# Patient Record
Sex: MALE | Race: WHITE | NOT HISPANIC OR LATINO | URBAN - METROPOLITAN AREA
[De-identification: names, ages, dates, MRNs, and addresses within clinical notes are randomized per-mention and may not be internally consistent; named-entity substitution may affect disease eponyms.]

---

## 2022-05-12 ENCOUNTER — APPOINTMENT (RX ONLY)
Dept: URBAN - METROPOLITAN AREA CLINIC 330 | Facility: CLINIC | Age: 82
Setting detail: DERMATOLOGY
End: 2022-05-12

## 2022-05-12 DIAGNOSIS — L90.5 SCAR CONDITIONS AND FIBROSIS OF SKIN: ICD-10-CM

## 2022-05-12 DIAGNOSIS — L81.4 OTHER MELANIN HYPERPIGMENTATION: ICD-10-CM

## 2022-05-12 DIAGNOSIS — Z85.828 PERSONAL HISTORY OF OTHER MALIGNANT NEOPLASM OF SKIN: ICD-10-CM

## 2022-05-12 DIAGNOSIS — L82.1 OTHER SEBORRHEIC KERATOSIS: ICD-10-CM

## 2022-05-12 PROCEDURE — 99213 OFFICE O/P EST LOW 20 MIN: CPT

## 2022-05-12 PROCEDURE — ? COUNSELING

## 2022-05-12 ASSESSMENT — LOCATION DETAILED DESCRIPTION DERM
LOCATION DETAILED: LEFT SUPERIOR UPPER BACK
LOCATION DETAILED: LEFT CLAVICULAR SKIN
LOCATION DETAILED: INFERIOR THORACIC SPINE
LOCATION DETAILED: RIGHT MID-UPPER BACK
LOCATION DETAILED: HAIR

## 2022-05-12 ASSESSMENT — LOCATION SIMPLE DESCRIPTION DERM
LOCATION SIMPLE: LEFT UPPER BACK
LOCATION SIMPLE: RIGHT UPPER BACK
LOCATION SIMPLE: LEFT CLAVICULAR SKIN
LOCATION SIMPLE: HAIR
LOCATION SIMPLE: UPPER BACK

## 2022-05-12 ASSESSMENT — LOCATION ZONE DERM
LOCATION ZONE: SCALP
LOCATION ZONE: TRUNK

## 2022-05-21 ASSESSMENT — LIFESTYLE VARIABLES
HOW OFTEN DO YOU HAVE A DRINK CONTAINING ALCOHOL: NEVER
HOW OFTEN DO YOU HAVE A DRINK CONTAINING ALCOHOL: 1

## 2022-05-21 ASSESSMENT — PATIENT HEALTH QUESTIONNAIRE - PHQ9
1. LITTLE INTEREST OR PLEASURE IN DOING THINGS: 0
SUM OF ALL RESPONSES TO PHQ QUESTIONS 1-9: 0
SUM OF ALL RESPONSES TO PHQ9 QUESTIONS 1 & 2: 0
2. FEELING DOWN, DEPRESSED OR HOPELESS: 0
SUM OF ALL RESPONSES TO PHQ QUESTIONS 1-9: 0

## 2022-05-24 ENCOUNTER — OFFICE VISIT (OUTPATIENT)
Dept: FAMILY MEDICINE CLINIC | Facility: CLINIC | Age: 82
End: 2022-05-24
Payer: MEDICARE

## 2022-05-24 VITALS
BODY MASS INDEX: 28.27 KG/M2 | OXYGEN SATURATION: 99 % | SYSTOLIC BLOOD PRESSURE: 124 MMHG | HEART RATE: 68 BPM | WEIGHT: 197 LBS | DIASTOLIC BLOOD PRESSURE: 80 MMHG

## 2022-05-24 DIAGNOSIS — Z87.442 HISTORY OF NEPHROLITHIASIS: ICD-10-CM

## 2022-05-24 DIAGNOSIS — N18.30 STAGE 3 CHRONIC KIDNEY DISEASE, UNSPECIFIED WHETHER STAGE 3A OR 3B CKD (HCC): ICD-10-CM

## 2022-05-24 DIAGNOSIS — E78.00 HYPERCHOLESTEROLEMIA: ICD-10-CM

## 2022-05-24 DIAGNOSIS — K21.9 GASTROESOPHAGEAL REFLUX DISEASE WITHOUT ESOPHAGITIS: ICD-10-CM

## 2022-05-24 DIAGNOSIS — Z00.00 MEDICARE ANNUAL WELLNESS VISIT, SUBSEQUENT: Primary | ICD-10-CM

## 2022-05-24 DIAGNOSIS — Z23 NEED FOR VACCINATION: ICD-10-CM

## 2022-05-24 DIAGNOSIS — E55.9 VITAMIN D DEFICIENCY: ICD-10-CM

## 2022-05-24 DIAGNOSIS — R73.09 OTHER ABNORMAL GLUCOSE: ICD-10-CM

## 2022-05-24 DIAGNOSIS — Z79.1 LONG TERM (CURRENT) USE OF NON-STEROIDAL ANTI-INFLAMMATORIES (NSAID): ICD-10-CM

## 2022-05-24 DIAGNOSIS — I10 ESSENTIAL HYPERTENSION: ICD-10-CM

## 2022-05-24 PROBLEM — Z97.2 WEARS DENTURES: Status: ACTIVE | Noted: 2019-04-29

## 2022-05-24 PROBLEM — M48.02 SPINAL STENOSIS IN CERVICAL REGION: Status: ACTIVE | Noted: 2019-12-11

## 2022-05-24 PROBLEM — F41.9 ANXIETY: Status: ACTIVE | Noted: 2017-01-29

## 2022-05-24 PROBLEM — I70.1 ATHEROSCLEROSIS OF RENAL ARTERY (HCC): Status: ACTIVE | Noted: 2018-05-18

## 2022-05-24 PROBLEM — I51.89 DIASTOLIC DYSFUNCTION: Status: ACTIVE | Noted: 2019-04-29

## 2022-05-24 PROBLEM — Z97.4 HEARING AID WORN: Status: ACTIVE | Noted: 2017-01-29

## 2022-05-24 PROBLEM — F17.201 TOBACCO DEPENDENCE IN REMISSION: Status: ACTIVE | Noted: 2020-06-15

## 2022-05-24 PROBLEM — I70.1 ATHEROSCLEROSIS OF RENAL ARTERY (HCC): Status: RESOLVED | Noted: 2018-05-18 | Resolved: 2022-05-24

## 2022-05-24 PROBLEM — E66.3 OVERWEIGHT WITH BODY MASS INDEX (BMI) 25.0-29.9: Status: ACTIVE | Noted: 2019-04-29

## 2022-05-24 PROBLEM — H93.13 BILATERAL TINNITUS: Status: ACTIVE | Noted: 2017-01-29

## 2022-05-24 PROCEDURE — G0009 ADMIN PNEUMOCOCCAL VACCINE: HCPCS | Performed by: NURSE PRACTITIONER

## 2022-05-24 PROCEDURE — 1123F ACP DISCUSS/DSCN MKR DOCD: CPT | Performed by: NURSE PRACTITIONER

## 2022-05-24 PROCEDURE — 90732 PPSV23 VACC 2 YRS+ SUBQ/IM: CPT | Performed by: NURSE PRACTITIONER

## 2022-05-24 PROCEDURE — G0439 PPPS, SUBSEQ VISIT: HCPCS | Performed by: NURSE PRACTITIONER

## 2022-05-24 RX ORDER — ASPIRIN 81 MG/1
TABLET ORAL
COMMUNITY

## 2022-05-24 SDOH — ECONOMIC STABILITY: TRANSPORTATION INSECURITY
IN THE PAST 12 MONTHS, HAS THE LACK OF TRANSPORTATION KEPT YOU FROM MEDICAL APPOINTMENTS OR FROM GETTING MEDICATIONS?: NO

## 2022-05-24 SDOH — ECONOMIC STABILITY: TRANSPORTATION INSECURITY
IN THE PAST 12 MONTHS, HAS LACK OF TRANSPORTATION KEPT YOU FROM MEETINGS, WORK, OR FROM GETTING THINGS NEEDED FOR DAILY LIVING?: NO

## 2022-05-24 SDOH — ECONOMIC STABILITY: FOOD INSECURITY: WITHIN THE PAST 12 MONTHS, YOU WORRIED THAT YOUR FOOD WOULD RUN OUT BEFORE YOU GOT MONEY TO BUY MORE.: NEVER TRUE

## 2022-05-24 SDOH — ECONOMIC STABILITY: HOUSING INSECURITY: IN THE LAST 12 MONTHS, HOW MANY PLACES HAVE YOU LIVED?: 1

## 2022-05-24 SDOH — ECONOMIC STABILITY: INCOME INSECURITY: IN THE LAST 12 MONTHS, WAS THERE A TIME WHEN YOU WERE NOT ABLE TO PAY THE MORTGAGE OR RENT ON TIME?: NO

## 2022-05-24 SDOH — HEALTH STABILITY: PHYSICAL HEALTH

## 2022-05-24 SDOH — ECONOMIC STABILITY: HOUSING INSECURITY
IN THE LAST 12 MONTHS, WAS THERE A TIME WHEN YOU DID NOT HAVE A STEADY PLACE TO SLEEP OR SLEPT IN A SHELTER (INCLUDING NOW)?: NO

## 2022-05-24 SDOH — ECONOMIC STABILITY: FOOD INSECURITY: WITHIN THE PAST 12 MONTHS, THE FOOD YOU BOUGHT JUST DIDN'T LAST AND YOU DIDN'T HAVE MONEY TO GET MORE.: NEVER TRUE

## 2022-05-24 ASSESSMENT — ENCOUNTER SYMPTOMS
SHORTNESS OF BREATH: 0
WHEEZING: 0
FACIAL SWELLING: 0
VOICE CHANGE: 0
CHEST TIGHTNESS: 0
STRIDOR: 0
RECTAL PAIN: 0
COUGH: 0
ABDOMINAL DISTENTION: 0
BLOOD IN STOOL: 0
CONSTIPATION: 0
EYE REDNESS: 0
EYES NEGATIVE: 1
EYE PAIN: 0
ALLERGIC/IMMUNOLOGIC NEGATIVE: 1
SINUS PAIN: 0
TROUBLE SWALLOWING: 0
RHINORRHEA: 0
DIARRHEA: 0
APNEA: 0
RESPIRATORY NEGATIVE: 1
VOMITING: 0
COLOR CHANGE: 0
BACK PAIN: 1
PHOTOPHOBIA: 0
NAUSEA: 0
EYE ITCHING: 0
SORE THROAT: 0
ANAL BLEEDING: 0
SINUS PRESSURE: 0
CHOKING: 0
ABDOMINAL PAIN: 0
GASTROINTESTINAL NEGATIVE: 1
EYE DISCHARGE: 0

## 2022-05-24 ASSESSMENT — SOCIAL DETERMINANTS OF HEALTH (SDOH)
HOW HARD IS IT FOR YOU TO PAY FOR THE VERY BASICS LIKE FOOD, HOUSING, MEDICAL CARE, AND HEATING?: NOT HARD AT ALL
WITHIN THE LAST YEAR, HAVE YOU BEEN HUMILIATED OR EMOTIONALLY ABUSED IN OTHER WAYS BY YOUR PARTNER OR EX-PARTNER?: NO
WITHIN THE LAST YEAR, HAVE YOU BEEN AFRAID OF YOUR PARTNER OR EX-PARTNER?: NO
WITHIN THE LAST YEAR, HAVE YOU BEEN KICKED, HIT, SLAPPED, OR OTHERWISE PHYSICALLY HURT BY YOUR PARTNER OR EX-PARTNER?: NO
WITHIN THE LAST YEAR, HAVE TO BEEN RAPED OR FORCED TO HAVE ANY KIND OF SEXUAL ACTIVITY BY YOUR PARTNER OR EX-PARTNER?: NO

## 2022-05-24 NOTE — PROGRESS NOTES
PROGRESS NOTE    Chief Complaint   Patient presents with    Medicare AWV     patient presenting fro AWV. no other concerns today       SUBJECTIVE:     Namita Ochoa is a very pleasant [de-identified] y.o. male with hx of hypertension, hyperlipidemia, anxiety, bladder cancer and skin cancer, seen today in office for follow up for lab results review. He is due for his AWV. He is accompanied by his spouse. Hypertension    The history is provided by the patient. This is a chronic problem. The problem has not changed since onset. Reports compliance with low sodium diet. Pertinent negatives include no chest pain, no orthopnea, no palpitations, no PND, no anxiety, no  confusion, no malaise/fatigue, no blurred vision, no headaches, no tinnitus, no peripheral edema, no dizziness, no shortness of breath, no nausea and no vomiting. Medications have helped lower blood pressure. Risk factors include a sedentary  lifestyle and hypertension. Hyperlipidemia:  The history is provided by the patient. This is a chronic problem. The problem occurs constantly. The problem has not changed since onset. Pertinent negatives  include no chest pain, no abdominal pain, no headaches and no shortness of breath. Nothing aggravates the symptoms. Treatments tried: lipitor. Currently on lipitor. Reports compliance with low fat, low cholesterol diet. The treatment provided moderate relief     Anxiety   The history is provided by the patient. This is a chronic problem. The problem has been gradually improving. Pertinent negatives include no chest pain, no abdominal  pain, no headaches and no shortness of breath. Stressful situations aggravate the symptoms. Calming techniques and medication relieve the symptoms. Treatments tried: cymbalta and ativan. The treatment provided moderate relief. Patient lost his  son about 8 years ago. Patient does use half a tablet of lorazepam nightly for sleep. Chronic back pain:  This is a chronic issue. Patient reports having had multiple low back surgical correction. He was recommended for an SI correction surgery. However, due to risk and complication, patient decided not to proceed with procedure. Patient has neuropathy  to bilateral lower legs and feet. Dose for Cymbalta increased to assist with symptom but no improvement in results. Patient reports good control for many years. He also takes ibuprofen as needed. Pertinent negatives include no chest pain, no shortness of breath,  no palpitation, no unilateral or focal weakness, no hematuria, no incontinence of bladder or bowel function. CKD:  Patient reports having kidney issues for sometime. Pt also reports having kidney stones history. He does admit to not drinking as much water as he should. Pt reports no chest pain,  no SOB, no palpitation, no unilateral or focal weakness, no nausea, no vomiting, no diarrhea, no abdominal pain, no incontinence of bladder or bowel functions. Dysphagia/cough with thin liquids:  Patient reports having history of bone spur to his Cspine which triggered some difficulty with swallowing. Pt also was present for 9/11 and did inhale dust/abestos, etc during 9/11. He reports having had an episode of epiglottis afterwards which required an admission. Patient was seen and evaluated in the past in [place] by an ENT, no changes in treatment as he had negative barium swallowing a couple of years ago. Pt still has symptoms but they are improved with use of pepcid over the counter. However, he has not taken meds on a daily regular basis. Pertinent negatives include no fever or chills, no chest pain, no SOB, no palpitations, no nausea, no vomiting, no GERD, no abdominal pain. Pt was offered in past visits for referral to ENT or GI or barrium swallow for furthe evaluation. Pt respectfully declined at this time but agrees to let me know if symptoms worsened.      Talked about gabapentin    Past Medical History, Past Surgical History, Family history, Social History, and Medications were all reviewed with the patient today and updated as necessary. Current Outpatient Medications   Medication Sig Dispense Refill    atorvastatin (LIPITOR) 40 MG tablet Take 40 mg by mouth daily      DULoxetine (CYMBALTA) 60 MG extended release capsule Take 60 mg by mouth daily      finasteride (PROSCAR) 5 MG tablet Take 5 mg by mouth daily      fluticasone (FLONASE) 50 MCG/ACT nasal spray 2 sprays by Nasal route daily      ibuprofen (ADVIL;MOTRIN) 600 MG tablet Take 600 mg by mouth 3 times daily      LORazepam (ATIVAN) 1 MG tablet Take by mouth.  metoprolol succinate (TOPROL XL) 50 MG extended release tablet Take 50 mg by mouth daily      aspirin 81 MG EC tablet Aspir-81 mg tablet,delayed release   Take 1 tablet every day by oral route.  Multiple Vitamins-Minerals (MULTIVITAMIN ADULTS 50+ PO) Multivitamin 50 Plus   ONCE DAILY      VITAMIN D PO Vitamin D   1 QD      ZINC PO zinc   1 QD       No current facility-administered medications for this visit.      No Known Allergies  Patient Active Problem List   Diagnosis    Coronary atherosclerosis    Wears dentures    Hearing aid worn    History of malignant neoplasm of skin    Long term (current) use of non-steroidal anti-inflammatories (nsaid)    Urolith    Anxiety    Benign neoplasm of colon    Benign prostatic hyperplasia with urinary obstruction    Bilateral tinnitus    Carotid artery stenosis    Diastolic dysfunction    Dyspnea on exertion    Essential hypertension    Gastroesophageal reflux disease    Hearing loss    Hemorrhoids    Herniated lumbar intervertebral disc    Hiatal hernia    Hypercholesterolemia    Low back pain    Neuropathy    Overweight with body mass index (BMI) 25.0-29.9    Raised prostate specific antigen    Spinal stenosis in cervical region    Spinal stenosis of lumbar region    Stage 3 chronic kidney disease (HCC)    Tobacco dependence in remission    [facility] [028181]     History reviewed. No pertinent past medical history. Past Surgical History:   Procedure Laterality Date    BLADDER REPAIR       Family History   Problem Relation Age of Onset    Alzheimer's Disease Paternal Grandmother     Cancer Sister         Pancreatic    Alzheimer's Disease Father     Cancer Mother         bone    Heart Disease Paternal Grandfather      Social History     Tobacco Use    Smoking status: Former Smoker     Quit date: 1965     Years since quittin.4    Smokeless tobacco: Never Used   Substance Use Topics    Alcohol use: Not Currently         REVIEW OF SYSTEM    Review of Systems   Constitutional: Negative. Negative for activity change, appetite change, chills, diaphoresis, fatigue, fever and unexpected weight change. HENT: Negative. Negative for congestion, dental problem, drooling, ear discharge, ear pain, facial swelling, hearing loss, mouth sores, nosebleeds, postnasal drip, rhinorrhea, sinus pressure, sinus pain, sneezing, sore throat, tinnitus, trouble swallowing and voice change. Eyes: Negative. Negative for photophobia, pain, discharge, redness, itching and visual disturbance. Respiratory: Negative. Negative for apnea, cough, choking, chest tightness, shortness of breath, wheezing and stridor. Cardiovascular: Negative. Negative for chest pain, palpitations and leg swelling. Gastrointestinal: Negative. Negative for abdominal distention, abdominal pain, anal bleeding, blood in stool, constipation, diarrhea, nausea, rectal pain and vomiting. Endocrine: Negative. Negative for cold intolerance, heat intolerance, polydipsia, polyphagia and polyuria. Genitourinary: Negative. Negative for decreased urine volume, difficulty urinating, dysuria, enuresis, flank pain, frequency, genital sores, hematuria and urgency. Musculoskeletal: Positive for arthralgias, back pain and myalgias. Negative for gait problem, joint swelling, neck pain and neck stiffness. Skin: Negative. Negative for color change, pallor, rash and wound. Allergic/Immunologic: Negative. Negative for environmental allergies, food allergies and immunocompromised state. Neurological: Negative. Negative for dizziness, tremors, seizures, syncope, facial asymmetry, speech difficulty, weakness, light-headedness, numbness and headaches. Hematological: Negative. Negative for adenopathy. Does not bruise/bleed easily. Psychiatric/Behavioral: Positive for sleep disturbance. Negative for agitation, behavioral problems, confusion, decreased concentration, dysphoric mood, hallucinations, self-injury and suicidal ideas. The patient is nervous/anxious. The patient is not hyperactive. OBJECTIVE:  /80 (Site: Right Upper Arm, Position: Sitting)   Pulse 68   Wt 197 lb (89.4 kg)   SpO2 99%   BMI 28.27 kg/m²      Physical Exam  Constitutional:       General: He is not in acute distress. Appearance: Normal appearance. He is not ill-appearing, toxic-appearing or diaphoretic. HENT:      Head: Normocephalic and atraumatic. Right Ear: Tympanic membrane, ear canal and external ear normal.      Left Ear: Tympanic membrane, ear canal and external ear normal.      Nose: Nose normal.      Mouth/Throat:      Mouth: Mucous membranes are moist.      Pharynx: Oropharynx is clear. Eyes:      Extraocular Movements: Extraocular movements intact. Conjunctiva/sclera: Conjunctivae normal.      Pupils: Pupils are equal, round, and reactive to light. Neck:      Vascular: No carotid bruit. Cardiovascular:      Rate and Rhythm: Normal rate and regular rhythm. Pulses: Normal pulses. Heart sounds: Normal heart sounds. No murmur heard. No friction rub. No gallop. Pulmonary:      Effort: Pulmonary effort is normal. No respiratory distress. Breath sounds: Normal breath sounds. No stridor.  No wheezing, rhonchi or rales. Chest:      Chest wall: No tenderness. Abdominal:      General: Abdomen is flat. Bowel sounds are normal. There is no distension. Palpations: Abdomen is soft. There is no mass. Tenderness: There is no abdominal tenderness. There is no guarding or rebound. Hernia: No hernia is present. Musculoskeletal:         General: Tenderness present. Cervical back: Normal range of motion and neck supple. No rigidity or tenderness. Lymphadenopathy:      Cervical: No cervical adenopathy. Skin:     General: Skin is warm and dry. Capillary Refill: Capillary refill takes less than 2 seconds. Neurological:      General: No focal deficit present. Mental Status: He is alert and oriented to person, place, and time. Psychiatric:         Mood and Affect: Mood normal.         Behavior: Behavior normal.         Thought Content: Thought content normal.         Judgment: Judgment normal.       Medical problems and test results were reviewed with the patient today. Lab Results   Component Value Date     (H) 05/17/2022    K 4.4 05/17/2022     05/17/2022    CO2 23 05/17/2022    BUN 34 (H) 05/17/2022    CREATININE 1.33 (H) 05/17/2022    GLUCOSE 90 05/17/2022    CALCIUM 9.5 05/17/2022    PROT 6.5 05/17/2022    LABALBU 4.1 05/17/2022    BILITOT 0.6 05/17/2022    ALKPHOS 64 05/17/2022    AST 20 05/17/2022    ALT 16 05/17/2022    GFRAA 52 (L) 02/07/2022    AGRATIO 1.7 05/17/2022     Lab Results   Component Value Date    TSH 1.120 05/17/2022     Lab Results   Component Value Date    PSA 2.0 05/17/2022    PSA 1.4 02/07/2022               ASSESSMENT and PLAN    1.  Medicare annual wellness visit, subsequent   Anticipatory guidance for age-seatbelts, avoid cell phone use in car (may use hands free), no texting while driving, avoid social drugs and tobacco, limit alcohol, fall safety, personal safety with appropriate use of helmets and equipment for protection, and appropriate use of sun screen and sun protection to prevent skin cancer. Encourage healthy diet and exercise daily. 2. Essential hypertension  -     CBC with Auto Differential; Future  -     Comprehensive Metabolic Panel; Future  -     TSH 3RD GENERATION; Future    Blood pressure is stable at 124/80. Continue with current therapy    3. Hypercholesterolemia  -     Lipid Panel; Future    Stable. LDL is 54. Continue with current therapy. Repeat fasting labs in 6 months. 4. Other abnormal glucose  -     Hemoglobin A1c with eAG; Future    Glucose is stable at 90. We will check A1c in future labs. 5. Gastroesophageal reflux disease without esophagitis   Patient reports having increased coughing and intermittent dysphagia. He has been taking OTC Tums which provide very minimal relief. He has taken an OTC Pepcid which did provide relief. However, patient is concerned about taking medication on a daily basis. Advised patient that benefit of taking Pepcid on a nightly basis for prevention of silent reflux induced cough/dysphagia outweighs risk of taking medication as he is at increased risk for esophageal ulceration, esophageal varices, anemia due to bleeding, epiglottitis, aspiration pneumonia, stricture, etc. Patient verbalized understanding and agrees to try a once daily at bedtime Pepcid over-the-counter 20 mg. Patient was offered a referral to GI or ENT. However, patient respectfully declined and would like to trial treatment first.  He agrees to let me know if symptoms do not improve, he is to contact me for a referral to GI or ENT for further evaluation. 6. Long term (current) use of non-steroidal anti-inflammatories (nsaid)   Patient with a longstanding history of chronic back pain. He has an appointment with pain management on 6/2. He reports previous history of receiving  an injection to his back that provided moderate relief for a long time.      He has been taking 600 mg ibuprofen once to twice daily for his discomfort. This likely contributes to his slightly elevated creatinine level in addition to not drinking plenty of fluid on a daily basis. Patient was advised to reduce ibuprofen use if possible to trial Tylenol arthritis but to keep maximum dose of Tylenol to less than 4000 mg/day to avoid hepatotoxicity. Patient was offered for another alternative treatment gabapentin or muscle relaxant. We reviewed risk and benefits of medication. Patient would like to wait at this time but agrees to let me know if he would like to trial a medication prior to seeing his pain management specialist.     7. Need for vaccination  -     Pneumococcal polysaccharide vaccine 23-valent greater than or equal to 1yo subcutaneous/IM    Patient is due for pneumonia vaccine. Discussed current CDC recommendation for immunization. Pt is amenable to receiving vaccine today in office. VIS provided. Vaccine administered. 8. Vitamin D deficiency  -     Vitamin D 25 Hydroxy; Future    Vitamin D level normal.  Continue current therapy. 9. Stage 3 chronic kidney disease, unspecified whether stage 3a or 3b CKD (HCC)   Creatinine level improved since last lab draw and is now down to 1.33. Patient has already increase his water intake to about 32 ounces per day. Again patient was advised on importance of daily oral intake of at least 64 ounces per day to ensure hydration for his kidneys. Nephrology referral was offered today but patient respectfully declined and would like to continue to increasing in his fluid intake in addition to reduce his ibuprofen intake. Patient reports he has an appointment with urology soon in June to establish care as he had a history of bladder cancer that he would like to monitor. 10. History of nephrolithiasis  -     Uric Acid; Future    Patient reports a history of kidney stones. We will check uric acid with next lab draw.         Orders Placed This Encounter   Procedures    Pneumococcal polysaccharide vaccine 23-valent greater than or equal to 3yo subcutaneous/IM    CBC with Auto Differential     Standing Status:   Future     Standing Expiration Date:   5/24/2023    Comprehensive Metabolic Panel     Standing Status:   Future     Standing Expiration Date:   5/24/2023    Hemoglobin A1c with eAG     Standing Status:   Future     Standing Expiration Date:   5/24/2023    Lipid Panel     Standing Status:   Future     Standing Expiration Date:   5/24/2023     Order Specific Question:   Is Patient Fasting?/# of Hours     Answer:   8    Vitamin D 25 Hydroxy     Standing Status:   Future     Standing Expiration Date:   5/24/2023    Uric Acid     Standing Status:   Future     Standing Expiration Date:   5/24/2023    TSH 3RD GENERATION     Standing Status:   Future     Standing Expiration Date:   5/24/2023         Elements of this note have been dictated using speech recognition software. As a result, errors of speech recognition may have occurred. On this date 5/24/2022 I have spent 30 minutes reviewing previous notes, test results and face to face with the patient discussing the diagnosis and importance of compliance with the treatment plan as well as documenting on the day of the visit. Greater than 50% of this visit was spent counseling the patient about test results, prognosis, importance of compliance, education about disease process, benefits of medications, instructions for management of acute symptoms, and follow up plans. Return in about 6 months (around 11/24/2022) for Follow up with fasting labs prior.      STEFFANY [place] - CNP     Medicare Annual Wellness Visit    Jenniferela November is here for Medicare AWV (patient presenting fro AWV. no other concerns today)    Assessment & Plan   Medicare annual wellness visit, subsequent        Recommendations for Preventive Services Due: see orders and patient instructions/AVS.  Recommended screening schedule for the next 5-10 years is provided to the patient in written form: see Patient Instructions/AVS.     Return in about 6 months (around 11/24/2022) for Follow up with fasting labs prior. Subjective   The following acute and/or chronic problems were also addressed today:  See above for assessment and plans for chronic problems addressed. Patient's complete Health Risk Assessment and screening values have been reviewed and are found in Flowsheets. The following problems were reviewed today and where indicated follow up appointments were made and/or referrals ordered. Positive Risk Factor Screenings with Interventions:               General Health and ACP:  General  In general, how would you say your health is?: Good  In the past 7 days, have you experienced any of the following: New or Increased Pain, New or Increased Fatigue, Loneliness, Social Isolation, Stress or Anger?: (!) Yes  Select all that apply: (!) New or Increased Pain  Do you get the social and emotional support that you need?: Yes  Do you have a Living Will?: Yes    Advance Directives     Power of  Living Will ACP-Advance Directive ACP-Power of     Not on File Not on File Not on File Not on File      General Health Risk Interventions:  · Pain issues: Pt is scheduled with pain management on 6/2/22. See above for plan   · Anger: Currently taking cymbalta 60mg daily. Trialed additional 20mg without improvement. Takin lorazepam as needed for anxiety/sleep with good results. Objective   Vitals:    05/24/22 1033   BP: 124/80   Site: Right Upper Arm   Position: Sitting   Pulse: 68   SpO2: 99%   Weight: 197 lb (89.4 kg)      Body mass index is 28.27 kg/m². No Known Allergies  Prior to Visit Medications    Medication Sig Taking?  Authorizing Provider   atorvastatin (LIPITOR) 40 MG tablet Take 40 mg by mouth daily Yes Ar Automatic Reconciliation   DULoxetine (CYMBALTA) 60 MG extended release capsule Take 60 mg by mouth daily Yes Ar Automatic Reconciliation   finasteride (PROSCAR) 5 MG tablet Take 5 mg by mouth daily Yes Ar Automatic Reconciliation   fluticasone (FLONASE) 50 MCG/ACT nasal spray 2 sprays by Nasal route daily Yes Ar Automatic Reconciliation   ibuprofen (ADVIL;MOTRIN) 600 MG tablet Take 600 mg by mouth 3 times daily Yes Ar Automatic Reconciliation   LORazepam (ATIVAN) 1 MG tablet Take by mouth. Yes Ar Automatic Reconciliation   metoprolol succinate (TOPROL XL) 50 MG extended release tablet Take 50 mg by mouth daily Yes Ar Automatic Reconciliation   aspirin 81 MG EC tablet Aspir-81 mg tablet,delayed release   Take 1 tablet every day by oral route.   Historical Provider, MD   Multiple Vitamins-Minerals (MULTIVITAMIN ADULTS 50+ PO) Multivitamin 50 Plus   ONCE DAILY  Historical Provider, MD   VITAMIN D PO Vitamin D   1 QD  Historical Provider, MD   ZINC PO zinc   1 QD  Historical Provider, MD [place] (Including outside providers/suppliers regularly involved in providing care):   Patient Care Team:  STEFFANY Carrasco CNP as PCP - General  STEFFANY Carrasco CNP as PCP - [facility] Empaneled Provider     Reviewed and updated this visit:  Tobacco  Allergies  Meds  Problems  Med Hx  Surg Hx  Soc Hx  Fam Hx

## 2022-06-14 ENCOUNTER — TELEMEDICINE (OUTPATIENT)
Dept: FAMILY MEDICINE CLINIC | Facility: CLINIC | Age: 82
End: 2022-06-14
Payer: MEDICARE

## 2022-06-14 DIAGNOSIS — R53.81 MALAISE: ICD-10-CM

## 2022-06-14 DIAGNOSIS — R52 BODY ACHES: ICD-10-CM

## 2022-06-14 DIAGNOSIS — R05.9 COUGH: ICD-10-CM

## 2022-06-14 DIAGNOSIS — R09.81 NASAL CONGESTION: ICD-10-CM

## 2022-06-14 DIAGNOSIS — R50.9 FEVER, UNSPECIFIED FEVER CAUSE: ICD-10-CM

## 2022-06-14 DIAGNOSIS — R09.82 POST-NASAL DRIP: ICD-10-CM

## 2022-06-14 DIAGNOSIS — J06.9 ACUTE URI: ICD-10-CM

## 2022-06-14 DIAGNOSIS — U07.1 COVID: Primary | ICD-10-CM

## 2022-06-14 PROCEDURE — 99442 PR PHYS/QHP TELEPHONE EVALUATION 11-20 MIN: CPT | Performed by: NURSE PRACTITIONER

## 2022-06-14 RX ORDER — FLUTICASONE PROPIONATE 50 MCG
2 SPRAY, SUSPENSION (ML) NASAL DAILY
Qty: 1 EACH | Refills: 0 | Status: SHIPPED | OUTPATIENT
Start: 2022-06-14 | End: 2022-06-28

## 2022-06-14 RX ORDER — DOXYCYCLINE HYCLATE 100 MG/1
100 CAPSULE ORAL 2 TIMES DAILY
Qty: 14 CAPSULE | Refills: 0 | Status: SHIPPED | OUTPATIENT
Start: 2022-06-14 | End: 2022-06-21

## 2022-06-14 RX ORDER — LORATADINE 10 MG/1
10 TABLET ORAL DAILY
Qty: 14 TABLET | Refills: 0 | Status: SHIPPED | OUTPATIENT
Start: 2022-06-14 | End: 2022-06-28

## 2022-06-14 ASSESSMENT — ENCOUNTER SYMPTOMS
EYE DISCHARGE: 0
DIARRHEA: 0
SINUS PAIN: 0
EYE PAIN: 0
SORE THROAT: 1
BACK PAIN: 0
COUGH: 1
BLOOD IN STOOL: 0
ANAL BLEEDING: 0
GASTROINTESTINAL NEGATIVE: 1
SHORTNESS OF BREATH: 0
STRIDOR: 0
ABDOMINAL PAIN: 0
RHINORRHEA: 1
RECTAL PAIN: 0
ABDOMINAL DISTENTION: 0
EYES NEGATIVE: 1
CHEST TIGHTNESS: 0
WHEEZING: 0
ALLERGIC/IMMUNOLOGIC NEGATIVE: 1
CONSTIPATION: 0
NAUSEA: 0
SINUS PRESSURE: 0
TROUBLE SWALLOWING: 0
VOMITING: 0
FACIAL SWELLING: 0
VOICE CHANGE: 0

## 2022-06-14 NOTE — PATIENT INSTRUCTIONS
Patient Education      Patient Education        Upper Respiratory Infection (Cold): Care Instructions  Your Care Instructions     An upper respiratory infection, or URI, is an infection of the nose, sinuses, or throat. URIs are spread by coughs, sneezes, and direct contact. The common cold is the most frequent kind of URI. The flu and sinus infections are otherkinds of URIs. Almost all URIs are caused by viruses. Antibiotics won't cure them. But you can treat most infections with home care. This may include drinking lots of fluids and taking over-the-counter pain medicine. You will probably feel better in 4to 10 days. The doctor has checked you carefully, but problems can develop later. If you notice any problems or new symptoms, get medical treatment right away. Follow-up care is a key part of your treatment and safety. Be sure to make and go to all appointments, and call your doctor if you are having problems. It's also a good idea to know your test results and keep alist of the medicines you take. How can you care for yourself at home?  To prevent dehydration, drink plenty of fluids. Choose water and other clear liquids until you feel better. If you have kidney, heart, or liver disease and have to limit fluids, talk with your doctor before you increase the amount of fluids you drink.  Take an over-the-counter pain medicine, such as acetaminophen (Tylenol), ibuprofen (Advil, Motrin), or naproxen (Aleve). Read and follow all instructions on the label.  Before you use cough and cold medicines, check the label. These medicines may not be safe for young children or for people with certain health problems.  Be careful when taking over-the-counter cold or flu medicines and Tylenol at the same time. Many of these medicines have acetaminophen, which is Tylenol. Read the labels to make sure that you are not taking more than the recommended dose. Too much acetaminophen (Tylenol) can be harmful.    Get plenty of rest.   Do not smoke or allow others to smoke around you. If you need help quitting, talk to your doctor about stop-smoking programs and medicines. These can increase your chances of quitting for good. When should you call for help? Call 911 anytime you think you may need emergency care. For example, call if:     You have severe trouble breathing. Call your doctor now or seek immediate medical care if:     You seem to be getting much sicker.      You have new or worse trouble breathing.      You have a new or higher fever.      You have a new rash. Watch closely for changes in your health, and be sure to contact your doctor if:     You have a new symptom, such as a sore throat, an earache, or sinus pain.      You cough more deeply or more often, especially if you notice more mucus or a change in the color of your mucus.      You do not get better as expected. Where can you learn more? Go to [email or web address] org and sign in to your [employer] account. Enter A717 in the SocialGO box to learn more about \"Upper Respiratory Infection (Cold): Care Instructions. \"     If you do not have an account, please click on the \"Sign Up Now\" link. Current as of: July 6, 2021               Content Version: 13.2  © 2006-2022 Healthwise, Incorporated. Care instructions adapted under license by [facility] [place] ([facility]). If you have questions about a medical condition or this instruction, always ask your healthcare professional. [place] [address] any warranty or liability for your use of this information. 10 Things to Do When You Have COVID-19      Talk to your doctor about treatment. They might have you take medicine to help prevent serious illness. Stay home. Don't go to school, work, or public areas. And don't use public transportation, ride-shares, or taxis unless you have no choice. Leave your home only if you need to get medical care.  But call the doctor's

## 2022-06-14 NOTE — PROGRESS NOTES
[address]  Phone: [phone] Fax [phone]  Sophia Estrada MS, APRN, FNP-C    More Momin is a [de-identified] y.o. male evaluated via audio-only technology on 6/14/2022 for   Chief Complaint   Patient presents with    Nasal Congestion     Pt was unable to figure out how to open link to A/V VV so VV was conducted audio only over the phone. The pt reports starting Sunday PM with nasal congestion with clear rhinorrhea, scratchy sore throat-PND, dry cough secondary to PND, body aches, fever (T-max 101), and malaise. The pt denies any sinus pressure or pain, SOB, wheezing, CP, N/V/D/abd pain associated. Pt reports taking OTC Tylenol, Mucinex DM, Vitamin D, and Zinc. Pt reports being UTD on Covid vax. Pt reports + at home Covid test 6/13/22. .    Assessment & Plan:     1. COVID  The pt reports starting Sunday PM with nasal congestion with clear rhinorrhea, scratchy sore throat-PND, dry cough secondary to PND, body aches, fever (T-max 101), and malaise. The pt denies any sinus pressure or pain, SOB, wheezing, CP, N/V/D/abd pain associated. Pt reports taking OTC Tylenol, Mucinex DM, Vitamin D, and Zinc. Pt reports being UTD on Covid vax. Pt reports + at home Covid test 6/13/22. Discussed with pt. Symptoms relatively mild, but due to age and hx of CKD, will cover with abx for possible secondary acute URI. Checked allergies. Will cover with Doxy 100 mg po bid for 7 days. Will treat other symptoms as below. With symptoms starting Sunday PM,per CDC guidelines below- pt will want to quarantine through at least Friday, 6/17/22 and can clear quarantine after that day as long as his symptoms are improving and he has been fever free for 24 consecutive hours without OTC Tylenol. Pt encouraged to continue to wear a mask that covers his nose and mouth when around others after clearing quarantine until his symptoms fully resolve. Pt can f/u PRN.  Agrees to call in a few days if no better and agrees to call sooner for any new/worsening symptoms or concerns. Pt agrees to go to the ER for severe symptoms as discussed. Per CDC current guidelines for quarantine, patient was instructed to remain in quarantine for at least 5 days and patient can be off of quarantine with strict well fitting mask wearing for the following 5 to 10 days after quarantine provided that symptoms are improved after 5 days of quarantine and patient remains afebrile at least 24 hours without use of antipyretics. Patient was instructed to contact office with any worsening symptoms or go to the ED for any chest pain or shortness of breath. 2. Acute URI  See # 1 above. - doxycycline hyclate (VIBRAMYCIN) 100 MG capsule; Take 1 capsule by mouth 2 times daily for 7 days  Dispense: 14 capsule; Refill: 0    3. Nasal congestion  4. Post-nasal drip  Will have pt continue PRN OTC Mucinex DM as directed. Will also give 14 days of Flonase and Claritin as directed to help with above symptoms. - fluticasone (FLONASE) 50 MCG/ACT nasal spray; 2 sprays by Each Nostril route daily for 14 days  Dispense: 1 each; Refill: 0  - loratadine (CLARITIN) 10 MG tablet; Take 1 tablet by mouth daily for 14 days  Dispense: 14 tablet; Refill: 0    5. Cough  Pt can continue PRN OTC Mucinex DM as directed to help with cough. 6. Body aches  7. Malaise  8. Fever, unspecified fever cause  Pt can continue PRN OTC Tylenol as directed to help with body aches and fever. Pt encouraged to stay well hydrated and get plenty of rest over next few days. Pt can continue OTC Vitamin D and Zinc as before. Recommend pt also take OTC Vitamin C as directed to help boost immune system while sick as well. Return if symptoms worsen or fail to improve, for Call in a few days if no better or sooner for new/worsening symptoms. ER for severe symptoms.     12  Subjective/Objective:     More Momin (: 1940) is a [de-identified] y.o. male, Established patient patient, seen via audio only VV for evaluation of the following chief complaint(s):    Nasal Congestion    Pt was unable to figure out how to open link to A/V VV so VV was conducted audio only over the phone. The pt reports starting Sunday PM with nasal congestion with clear rhinorrhea, scratchy sore throat-PND, dry cough secondary to PND, body aches, fever (T-max 101), and malaise. The pt denies any sinus pressure or pain, SOB, wheezing, CP, N/V/D/abd pain associated. Pt reports taking OTC Tylenol, Mucinex DM, Vitamin D, and Zinc. Pt reports being UTD on Covid vax. Pt reports + at home Covid test 6/13/22. Current Outpatient Medications   Medication Sig Dispense Refill    doxycycline hyclate (VIBRAMYCIN) 100 MG capsule Take 1 capsule by mouth 2 times daily for 7 days 14 capsule 0    fluticasone (FLONASE) 50 MCG/ACT nasal spray 2 sprays by Each Nostril route daily for 14 days 1 each 0    loratadine (CLARITIN) 10 MG tablet Take 1 tablet by mouth daily for 14 days 14 tablet 0    aspirin 81 MG EC tablet Aspir-81 mg tablet,delayed release   Take 1 tablet every day by oral route.  Multiple Vitamins-Minerals (MULTIVITAMIN ADULTS 50+ PO) Multivitamin 50 Plus   ONCE DAILY      VITAMIN D PO Vitamin D   1 QD      ZINC PO zinc   1 QD      atorvastatin (LIPITOR) 40 MG tablet Take 40 mg by mouth daily      DULoxetine (CYMBALTA) 60 MG extended release capsule Take 60 mg by mouth daily      finasteride (PROSCAR) 5 MG tablet Take 5 mg by mouth daily      ibuprofen (ADVIL;MOTRIN) 600 MG tablet Take 600 mg by mouth 3 times daily      LORazepam (ATIVAN) 1 MG tablet Take by mouth.  metoprolol succinate (TOPROL XL) 50 MG extended release tablet Take 50 mg by mouth daily       No current facility-administered medications for this visit. Review of Systems   Constitutional: Positive for fatigue and fever (t-max 101). Negative for appetite change, chills, diaphoresis and unexpected weight change. HENT: Positive for congestion (with clear rhinorrhea), postnasal drip (scratchy sore throat-PND), rhinorrhea (clear) and sore throat (scratchy sore throat-PND). Negative for ear discharge, ear pain, facial swelling, hearing loss, mouth sores, nosebleeds, sinus pressure, sinus pain, sneezing, tinnitus, trouble swallowing and voice change. Eyes: Negative. Negative for pain, discharge and visual disturbance. Respiratory: Positive for cough (dry secondary to PND). Negative for chest tightness, shortness of breath, wheezing and stridor. Cardiovascular: Negative. Negative for chest pain, palpitations and leg swelling. Gastrointestinal: Negative. Negative for abdominal distention, abdominal pain, anal bleeding, blood in stool, constipation, diarrhea, nausea, rectal pain and vomiting. Endocrine: Negative. Genitourinary: Negative. Negative for decreased urine volume, difficulty urinating, dysuria, flank pain, frequency, genital sores, hematuria, penile discharge, penile pain, penile swelling, scrotal swelling, testicular pain and urgency. Musculoskeletal: Positive for myalgias (body aches). Negative for arthralgias, back pain, gait problem, joint swelling, neck pain and neck stiffness. Skin: Negative. Negative for pallor and rash. Allergic/Immunologic: Negative. Negative for environmental allergies. Neurological: Negative. Negative for dizziness, tremors, syncope, weakness, light-headedness, numbness and headaches. Hematological: Negative. Negative for adenopathy. Does not bruise/bleed easily. No flowsheet data found.      No physical exam or vitals performed today as this was an audio only virtual visit    Jackie Jain who was evaluated through a patient-initiated, synchronous (real-time) audio only encounter, and/or he  healthcare decision maker, is aware that it is a billable service, with coverage as determined by he insurance carrier; he  provided verbal consent to proceed: Yes] he   has not had a related appointment within my department in the past 7 days or scheduled within the next 24 hours. Beltran Landa, was evaluated through a synchronous (real-time) audio-video encounter. The patient (or guardian if applicable) is aware that this is a billable service, which includes applicable co-pays. This Virtual Visit was conducted with patient's (and/or legal guardian's) consent. The visit was conducted pursuant to the emergency declaration under the [facility], [facility] authority and the [employer] General Act. Patient identification was verified, and a caregiver was present when appropriate. The patient was located at Home: [address] [facility] [address] [facility]. Provider was located at [facility] ([address] [place] [address]): [address] [place] [address] Dr Rachael Mckay [address] [place] [address]. Total time spent for this encounter: 20 min    --STEFFANY Flanagan NP on 6/14/2022 at 1:22 PM    An electronic signature was used to authenticate this note. On this date 6/14/2022 I have spent 20 minutes reviewing previous notes, test results and face to face (virtual) with the patient discussing the diagnosis and importance of compliance with the treatment plan as well as documenting on the day of the visit. ;    1:00-1:20 PM.     STEFFANY Flanagan NP

## 2022-10-10 RX ORDER — FINASTERIDE 5 MG/1
5 TABLET, FILM COATED ORAL DAILY
Qty: 90 TABLET | Refills: 3 | Status: SHIPPED | OUTPATIENT
Start: 2022-10-10

## 2022-11-07 DIAGNOSIS — Z87.442 HISTORY OF NEPHROLITHIASIS: ICD-10-CM

## 2022-11-07 DIAGNOSIS — R73.09 OTHER ABNORMAL GLUCOSE: ICD-10-CM

## 2022-11-07 DIAGNOSIS — E78.00 HYPERCHOLESTEROLEMIA: ICD-10-CM

## 2022-11-07 DIAGNOSIS — E55.9 VITAMIN D DEFICIENCY: ICD-10-CM

## 2022-11-07 DIAGNOSIS — I10 ESSENTIAL HYPERTENSION: ICD-10-CM

## 2022-11-07 LAB
25(OH)D3 SERPL-MCNC: 73 NG/ML (ref 30–100)
ALBUMIN SERPL-MCNC: 3.6 G/DL (ref 3.2–4.6)
ALBUMIN/GLOB SERPL: 1.2 {RATIO} (ref 0.4–1.6)
ALP SERPL-CCNC: 80 U/L (ref 50–136)
ALT SERPL-CCNC: 26 U/L (ref 12–65)
ANION GAP SERPL CALC-SCNC: 0 MMOL/L (ref 2–11)
AST SERPL-CCNC: 24 U/L (ref 15–37)
BASOPHILS # BLD: 0.1 K/UL (ref 0–0.2)
BASOPHILS NFR BLD: 1 % (ref 0–2)
BILIRUB SERPL-MCNC: 0.7 MG/DL (ref 0.2–1.1)
BUN SERPL-MCNC: 26 MG/DL (ref 8–23)
CALCIUM SERPL-MCNC: 9.5 MG/DL (ref 8.3–10.4)
CHLORIDE SERPL-SCNC: 111 MMOL/L (ref 101–110)
CHOLEST SERPL-MCNC: 118 MG/DL
CO2 SERPL-SCNC: 28 MMOL/L (ref 21–32)
CREAT SERPL-MCNC: 1.3 MG/DL (ref 0.8–1.5)
DIFFERENTIAL METHOD BLD: ABNORMAL
EOSINOPHIL # BLD: 0.3 K/UL (ref 0–0.8)
EOSINOPHIL NFR BLD: 4 % (ref 0.5–7.8)
ERYTHROCYTE [DISTWIDTH] IN BLOOD BY AUTOMATED COUNT: 12.7 % (ref 11.9–14.6)
EST. AVERAGE GLUCOSE BLD GHB EST-MCNC: 108 MG/DL
GLOBULIN SER CALC-MCNC: 2.9 G/DL (ref 2.8–4.5)
GLUCOSE SERPL-MCNC: 97 MG/DL (ref 65–100)
HBA1C MFR BLD: 5.4 % (ref 4.8–5.6)
HCT VFR BLD AUTO: 45.5 % (ref 41.1–50.3)
HDLC SERPL-MCNC: 47 MG/DL (ref 40–60)
HDLC SERPL: 2.5 {RATIO}
HGB BLD-MCNC: 14.8 G/DL (ref 13.6–17.2)
IMM GRANULOCYTES # BLD AUTO: 0.1 K/UL (ref 0–0.5)
IMM GRANULOCYTES NFR BLD AUTO: 1 % (ref 0–5)
LDLC SERPL CALC-MCNC: 51.8 MG/DL
LYMPHOCYTES # BLD: 2.1 K/UL (ref 0.5–4.6)
LYMPHOCYTES NFR BLD: 26 % (ref 13–44)
MCH RBC QN AUTO: 33.2 PG (ref 26.1–32.9)
MCHC RBC AUTO-ENTMCNC: 32.5 G/DL (ref 31.4–35)
MCV RBC AUTO: 102 FL (ref 82–102)
MONOCYTES # BLD: 0.6 K/UL (ref 0.1–1.3)
MONOCYTES NFR BLD: 8 % (ref 4–12)
NEUTS SEG # BLD: 4.9 K/UL (ref 1.7–8.2)
NEUTS SEG NFR BLD: 60 % (ref 43–78)
NRBC # BLD: 0 K/UL (ref 0–0.2)
PLATELET # BLD AUTO: 294 K/UL (ref 150–450)
PMV BLD AUTO: 10 FL (ref 9.4–12.3)
POTASSIUM SERPL-SCNC: 4.4 MMOL/L (ref 3.5–5.1)
PROT SERPL-MCNC: 6.5 G/DL (ref 6.3–8.2)
RBC # BLD AUTO: 4.46 M/UL (ref 4.23–5.6)
SODIUM SERPL-SCNC: 139 MMOL/L (ref 133–143)
TRIGL SERPL-MCNC: 96 MG/DL (ref 35–150)
TSH, 3RD GENERATION: 1.1 UIU/ML (ref 0.36–3.74)
URATE SERPL-MCNC: 6.5 MG/DL (ref 2.6–6)
VLDLC SERPL CALC-MCNC: 19.2 MG/DL (ref 6–23)
WBC # BLD AUTO: 8.1 K/UL (ref 4.3–11.1)

## 2022-11-11 ENCOUNTER — APPOINTMENT (RX ONLY)
Dept: URBAN - METROPOLITAN AREA CLINIC 330 | Facility: CLINIC | Age: 82
Setting detail: DERMATOLOGY
End: 2022-11-11

## 2022-11-11 DIAGNOSIS — Z85.828 PERSONAL HISTORY OF OTHER MALIGNANT NEOPLASM OF SKIN: ICD-10-CM

## 2022-11-11 DIAGNOSIS — L57.8 OTHER SKIN CHANGES DUE TO CHRONIC EXPOSURE TO NONIONIZING RADIATION: ICD-10-CM | Status: INADEQUATELY CONTROLLED

## 2022-11-11 DIAGNOSIS — D69.2 OTHER NONTHROMBOCYTOPENIC PURPURA: ICD-10-CM

## 2022-11-11 DIAGNOSIS — L57.0 ACTINIC KERATOSIS: ICD-10-CM

## 2022-11-11 DIAGNOSIS — D22 MELANOCYTIC NEVI: ICD-10-CM | Status: STABLE

## 2022-11-11 PROBLEM — D48.5 NEOPLASM OF UNCERTAIN BEHAVIOR OF SKIN: Status: ACTIVE | Noted: 2022-11-11

## 2022-11-11 PROBLEM — D22.5 MELANOCYTIC NEVI OF TRUNK: Status: ACTIVE | Noted: 2022-11-11

## 2022-11-11 PROCEDURE — ? MEDICATION COUNSELING

## 2022-11-11 PROCEDURE — ? PRESCRIPTION

## 2022-11-11 PROCEDURE — ? OTHER

## 2022-11-11 PROCEDURE — 17000 DESTRUCT PREMALG LESION: CPT | Mod: 59

## 2022-11-11 PROCEDURE — ? COUNSELING

## 2022-11-11 PROCEDURE — ? BIOPSY BY SHAVE METHOD

## 2022-11-11 PROCEDURE — ? LIQUID NITROGEN

## 2022-11-11 PROCEDURE — ? FULL BODY SKIN EXAM

## 2022-11-11 PROCEDURE — 99214 OFFICE O/P EST MOD 30 MIN: CPT | Mod: 25

## 2022-11-11 PROCEDURE — 17003 DESTRUCT PREMALG LES 2-14: CPT

## 2022-11-11 PROCEDURE — 11102 TANGNTL BX SKIN SINGLE LES: CPT

## 2022-11-11 RX ORDER — FLUOROURACIL 5 MG/G
CREAM TOPICAL QD
Qty: 40 | Refills: 0 | Status: ERX | COMMUNITY
Start: 2022-11-11

## 2022-11-11 RX ADMIN — FLUOROURACIL: 5 CREAM TOPICAL at 00:00

## 2022-11-11 ASSESSMENT — LOCATION ZONE DERM
LOCATION ZONE: NOSE
LOCATION ZONE: ARM
LOCATION ZONE: FACE
LOCATION ZONE: SCALP
LOCATION ZONE: TRUNK

## 2022-11-11 ASSESSMENT — LOCATION SIMPLE DESCRIPTION DERM
LOCATION SIMPLE: SCALP
LOCATION SIMPLE: RIGHT CHEEK
LOCATION SIMPLE: LEFT FOREARM
LOCATION SIMPLE: RIGHT FOREHEAD
LOCATION SIMPLE: RIGHT FOREARM
LOCATION SIMPLE: POSTERIOR SCALP
LOCATION SIMPLE: UPPER BACK
LOCATION SIMPLE: LEFT CLAVICULAR SKIN
LOCATION SIMPLE: LEFT OCCIPITAL SCALP
LOCATION SIMPLE: NOSE
LOCATION SIMPLE: LEFT UPPER ARM
LOCATION SIMPLE: LEFT SCALP

## 2022-11-11 ASSESSMENT — LOCATION DETAILED DESCRIPTION DERM
LOCATION DETAILED: SUPERIOR THORACIC SPINE
LOCATION DETAILED: LEFT DISTAL DORSAL FOREARM
LOCATION DETAILED: LEFT SUPERIOR PARIETAL SCALP
LOCATION DETAILED: RIGHT CENTRAL PARIETAL SCALP
LOCATION DETAILED: LEFT CENTRAL FRONTAL SCALP
LOCATION DETAILED: INFERIOR THORACIC SPINE
LOCATION DETAILED: RIGHT SUPERIOR PREAURICULAR CHEEK
LOCATION DETAILED: RIGHT SUPERIOR FOREHEAD
LOCATION DETAILED: NASAL DORSUM
LOCATION DETAILED: RIGHT VENTRAL LATERAL PROXIMAL FOREARM
LOCATION DETAILED: LEFT DISTAL POSTERIOR UPPER ARM
LOCATION DETAILED: RIGHT PROXIMAL RADIAL DORSAL FOREARM
LOCATION DETAILED: LEFT SUPERIOR OCCIPITAL SCALP
LOCATION DETAILED: POSTERIOR MID-PARIETAL SCALP
LOCATION DETAILED: LEFT CENTRAL PARIETAL SCALP
LOCATION DETAILED: RIGHT VENTRAL DISTAL FOREARM
LOCATION DETAILED: LEFT CLAVICULAR SKIN
LOCATION DETAILED: RIGHT SUPERIOR PARIETAL SCALP

## 2022-11-11 ASSESSMENT — PAIN INTENSITY VAS: HOW INTENSE IS YOUR PAIN 0 BEING NO PAIN, 10 BEING THE MOST SEVERE PAIN POSSIBLE?: 1/10 PAIN

## 2022-11-11 NOTE — PROCEDURE: MEDICATION COUNSELING
Assessment/Plan:    No problem-specific Assessment & Plan notes found for this encounter  Diagnoses and all orders for this visit:    Annual physical exam    Screening for diabetic retinopathy  Comments:  follows with Lauro Stern - due for exam Dec 2023  Encounter for screening mammogram for malignant neoplasm of breast  -     Mammo screening bilateral w 3d & cad; Future    Screening for osteoporosis  -     DXA bone density spine hip and pelvis; Future    Diabetes mellitus without complication ([facility] [address] )  -     Lipid panel; Future    Anxiety disorder due to general medical condition with panic attack  -     busPIRone (BUSPAR) 10 mg tablet; Take 2 tablets (20 mg total) by mouth 3 (three) times a day as needed (anxiety)    PTSD (post-traumatic stress disorder)  -     busPIRone (BUSPAR) 10 mg tablet; Take 2 tablets (20 mg total) by mouth 3 (three) times a day as needed (anxiety)    Chronic hip pain, bilateral  -     XR hips bilateral 3-4 vw w pelvis if performed; Future    Other fatigue  -     TSH, 3rd generation with Free T4 reflex; Future  -     Comprehensive metabolic panel; Future    Other orders  -     NovoLIN 70/30 FlexPen (70-30) 100 UNIT/ML injection pen  -     ferrous sulfate 325 (65 Fe) mg tablet; Take 325 mg by mouth          -increases buspar to 20 mg TID  Continues to follow with LCSW  -follow up with multiple specialists as routinely done  -labs as ordered  -RTC 3 months     Subjective:      Patient ID: Donn Soriano is a 48 y o  female PMH GERD, DM, HTN, HLD, s/p spinal fusion 5/11 for C2 vertebral fracture presents for annual PE  Follows with Maryam Parrish for DM care, has appointment today  Follows with Dr Perry Clemons podiatry for routine foot exams and [facility] for eye exams     BP largly at goal  Follows with [facility] cardiology  Denies CP, SOB  Reports bilateral hip pain on going for several months   Notes it has been exacerbated and she attributes it to a fall she sustained in Jan 2022 which caused a vertebral fracture  Worse with movements  Concern for OA on 2021 CT  Requests refill on her buspar  She notes it is not helping as much as it once did  Currently taking 20mg BID  Follows with LCSW  HPI    The following portions of the patient's history were reviewed and updated as appropriate: allergies, current medications, past family history, past medical history, past social history, past surgical history and problem list     Review of Systems   Constitutional: Positive for activity change and fatigue  HENT: Negative  Respiratory: Negative for cough, shortness of breath and wheezing  Cardiovascular: Negative for chest pain, palpitations and leg swelling  Gastrointestinal: Negative  Musculoskeletal: Positive for arthralgias  Objective:      /76   Pulse 88   Resp 18   Ht 5' 5" (1 651 m)   Wt 98 kg (216 lb)   LMP  (LMP Unknown)   SpO2 97%   BMI 35 94 kg/m²          Physical Exam  Constitutional:       General: She is not in acute distress  Appearance: Normal appearance  She is well-developed  HENT:      Head: Normocephalic and atraumatic  Eyes:      Pupils: Pupils are equal, round, and reactive to light  Cardiovascular:      Rate and Rhythm: Normal rate and regular rhythm  Heart sounds: Normal heart sounds  No murmur heard  Pulmonary:      Effort: Pulmonary effort is normal  No respiratory distress  Breath sounds: Normal breath sounds  No wheezing  Abdominal:      General: Bowel sounds are normal  There is no distension  Palpations: Abdomen is soft  Tenderness: There is no abdominal tenderness  Musculoskeletal:      Right lower leg: No edema  Left lower leg: No edema  Skin:     General: Skin is warm and dry  Capillary Refill: Capillary refill takes less than 2 seconds  Neurological:      Mental Status: She is alert and oriented to person, place, and time     Psychiatric:         Mood and Affect: Mood normal          Behavior: Behavior normal          Thought Content:  Thought content normal          Judgment: Judgment normal  Dutasteride Pregnancy And Lactation Text: This medication is absolutely contraindicated in women, especially during pregnancy and breast feeding. Feminization of male fetuses is possible if taking while pregnant.

## 2022-11-11 NOTE — PROCEDURE: BIOPSY BY SHAVE METHOD
Hemostasis: Aluminum Chloride
Anesthesia Volume In Cc (Will Not Render If 0): 0.5
Cryotherapy Text: The wound bed was treated with cryotherapy after the biopsy was performed.
Lab: 6
Hide Anticipated Plan (Based On Presumed Biopsy Results)?: No
Dressing: bandage
Post-Care Instructions: I reviewed with the patient in detail post-care instructions. Patient is to keep the biopsy site dry overnight, and then apply bacitracin twice daily until healed. Patient may apply hydrogen peroxide soaks to remove any crusting.
Biopsy Method: Dermablade
Curettage Text: The wound bed was treated with curettage after the biopsy was performed.
Accession #: Skin path
Depth Of Biopsy: dermis
Anesthesia Type: 1% lidocaine with 1:100,000 epinephrine and a 1:10 solution of 8.4% sodium bicarbonate
Silver Nitrate Text: The wound bed was treated with silver nitrate after the biopsy was performed.
Wound Care: Petrolatum
Notification Instructions: Patient will be notified of biopsy results. However, patient instructed to call the office if not contacted within 2 weeks.
Biopsy Type: H and E
X Size Of Lesion In Cm: 0
Type Of Destruction Used: Curettage
Detail Level: Detailed
Electrodesiccation And Curettage Text: The wound bed was treated with electrodesiccation and curettage after the biopsy was performed.
Consent: Written consent was obtained and risks were reviewed including but not limited to scarring, infection, bleeding, scabbing, incomplete removal, nerve damage and allergy to anesthesia.
Billing Type: Third-Party Bill
Electrodesiccation Text: The wound bed was treated with electrodesiccation after the biopsy was performed.
Information: Selecting Yes will display possible errors in your note based on the variables you have selected. This validation is only offered as a suggestion for you. PLEASE NOTE THAT THE VALIDATION TEXT WILL BE REMOVED WHEN YOU FINALIZE YOUR NOTE. IF YOU WANT TO FAX A PRELIMINARY NOTE YOU WILL NEED TO TOGGLE THIS TO 'NO' IF YOU DO NOT WANT IT IN YOUR FAXED NOTE.
Was A Bandage Applied: Yes

## 2022-11-11 NOTE — PROCEDURE: LIQUID NITROGEN
Number Of Freeze-Thaw Cycles: 3 freeze-thaw cycles
Post-Care Instructions: I reviewed with the patient in detail post-care instructions. Patient is to wear sunprotection, and avoid picking at any of the treated lesions. Pt may apply Vaseline to crusted or scabbing areas.
Duration Of Freeze Thaw-Cycle (Seconds): 2
Render Note In Bullet Format When Appropriate: No
Detail Level: Detailed
Show Aperture Variable?: Yes
Consent: The patient's consent was obtained including but not limited to risks of crusting, scabbing, blistering, scarring, darker or lighter pigmentary change, recurrence, incomplete removal and infection.

## 2022-11-11 NOTE — PROCEDURE: MEDICATION COUNSELING
Xelannz Pregnancy And Lactation Text: This medication is Pregnancy Category D and is not considered safe during pregnancy.  The risk during breast feeding is also uncertain.

## 2022-11-11 NOTE — PROCEDURE: OTHER
Detail Level: Simple
Render Risk Assessment In Note?: yes
Note Text (......Xxx Chief Complaint.): This diagnosis correlates with the

## 2022-11-16 ENCOUNTER — OFFICE VISIT (OUTPATIENT)
Dept: FAMILY MEDICINE CLINIC | Facility: CLINIC | Age: 82
End: 2022-11-16
Payer: MEDICARE

## 2022-11-16 VITALS
OXYGEN SATURATION: 99 % | HEART RATE: 81 BPM | DIASTOLIC BLOOD PRESSURE: 80 MMHG | SYSTOLIC BLOOD PRESSURE: 116 MMHG | WEIGHT: 197 LBS | BODY MASS INDEX: 28.27 KG/M2

## 2022-11-16 DIAGNOSIS — F41.1 GENERALIZED ANXIETY DISORDER: ICD-10-CM

## 2022-11-16 DIAGNOSIS — R13.10 DYSPHAGIA, UNSPECIFIED TYPE: Primary | ICD-10-CM

## 2022-11-16 DIAGNOSIS — J30.89 OTHER ALLERGIC RHINITIS: ICD-10-CM

## 2022-11-16 DIAGNOSIS — R25.1 TREMOR OF LEFT HAND: ICD-10-CM

## 2022-11-16 DIAGNOSIS — I10 ESSENTIAL HYPERTENSION: ICD-10-CM

## 2022-11-16 DIAGNOSIS — R09.82 POST-NASAL DRIP: ICD-10-CM

## 2022-11-16 DIAGNOSIS — U09.9 COVID-19 LONG HAULER MANIFESTING CHRONIC FATIGUE: ICD-10-CM

## 2022-11-16 DIAGNOSIS — Z87.09 HISTORY OF EPIGLOTTITIS: ICD-10-CM

## 2022-11-16 DIAGNOSIS — Z98.890 HISTORY OF VOCAL CORD POLYPECTOMY: ICD-10-CM

## 2022-11-16 DIAGNOSIS — G93.32 COVID-19 LONG HAULER MANIFESTING CHRONIC FATIGUE: ICD-10-CM

## 2022-11-16 DIAGNOSIS — R49.0 HOARSENESS: ICD-10-CM

## 2022-11-16 DIAGNOSIS — R09.81 NASAL CONGESTION: ICD-10-CM

## 2022-11-16 PROCEDURE — G8484 FLU IMMUNIZE NO ADMIN: HCPCS | Performed by: NURSE PRACTITIONER

## 2022-11-16 PROCEDURE — G8417 CALC BMI ABV UP PARAM F/U: HCPCS | Performed by: NURSE PRACTITIONER

## 2022-11-16 PROCEDURE — G8427 DOCREV CUR MEDS BY ELIG CLIN: HCPCS | Performed by: NURSE PRACTITIONER

## 2022-11-16 PROCEDURE — 1123F ACP DISCUSS/DSCN MKR DOCD: CPT | Performed by: NURSE PRACTITIONER

## 2022-11-16 PROCEDURE — 1036F TOBACCO NON-USER: CPT | Performed by: NURSE PRACTITIONER

## 2022-11-16 PROCEDURE — 3078F DIAST BP <80 MM HG: CPT | Performed by: NURSE PRACTITIONER

## 2022-11-16 PROCEDURE — 99214 OFFICE O/P EST MOD 30 MIN: CPT | Performed by: NURSE PRACTITIONER

## 2022-11-16 PROCEDURE — 3074F SYST BP LT 130 MM HG: CPT | Performed by: NURSE PRACTITIONER

## 2022-11-16 RX ORDER — DULOXETIN HYDROCHLORIDE 60 MG/1
60 CAPSULE, DELAYED RELEASE ORAL DAILY
Qty: 90 CAPSULE | Refills: 3 | Status: SHIPPED | OUTPATIENT
Start: 2022-11-16

## 2022-11-16 RX ORDER — GABAPENTIN 100 MG/1
100 CAPSULE ORAL 2 TIMES DAILY
Qty: 180 CAPSULE | Refills: 1 | Status: SHIPPED | OUTPATIENT
Start: 2022-11-16 | End: 2023-05-15

## 2022-11-16 RX ORDER — DOXYCYCLINE 100 MG/1
100 TABLET ORAL 2 TIMES DAILY
Qty: 14 TABLET | Refills: 0 | Status: SHIPPED | OUTPATIENT
Start: 2022-11-16 | End: 2022-11-23

## 2022-11-16 RX ORDER — FLUTICASONE PROPIONATE 50 MCG
2 SPRAY, SUSPENSION (ML) NASAL DAILY
Qty: 3 EACH | Refills: 3 | Status: SHIPPED | OUTPATIENT
Start: 2022-11-16

## 2022-11-16 RX ORDER — AZELASTINE 1 MG/ML
1 SPRAY, METERED NASAL 2 TIMES DAILY
Qty: 60 ML | Refills: 11 | Status: SHIPPED | OUTPATIENT
Start: 2022-11-16

## 2022-11-16 RX ORDER — METOPROLOL SUCCINATE 25 MG/1
25 TABLET, EXTENDED RELEASE ORAL DAILY
Qty: 90 TABLET | Refills: 3 | Status: SHIPPED | OUTPATIENT
Start: 2022-11-16

## 2022-11-16 RX ORDER — LORAZEPAM 1 MG/1
TABLET ORAL
Qty: 30 TABLET | Refills: 5 | Status: SHIPPED | OUTPATIENT
Start: 2022-11-16 | End: 2023-05-15

## 2022-11-16 ASSESSMENT — PATIENT HEALTH QUESTIONNAIRE - PHQ9
1. LITTLE INTEREST OR PLEASURE IN DOING THINGS: 0
SUM OF ALL RESPONSES TO PHQ QUESTIONS 1-9: 0
SUM OF ALL RESPONSES TO PHQ9 QUESTIONS 1 & 2: 0
SUM OF ALL RESPONSES TO PHQ QUESTIONS 1-9: 0
2. FEELING DOWN, DEPRESSED OR HOPELESS: 0

## 2022-11-16 NOTE — PROGRESS NOTES
PROGRESS NOTE    Chief Complaint   Patient presents with    6 Month Follow-Up     Pt had covid in June and reports he is still SOB at times with intense coughing. Pt also reports some shakiness in his hands. Pt's wife reports he is also having some concerns with his BP and pulse being high, pt has 4 days of pulse listed. PMH of polyps on his vocal cords and the coughing seems to have made the symptoms return. SUBJECTIVE:     Oren Martínez is a very pleasant [de-identified] y.o. male with hx of hypertension, hyperlipidemia, anxiety, bladder cancer and skin cancer, seen today in office for follow up for lab results review. He is accompanied by his spouse. Hypertension    This is a chronic problem. The problem has not changed since onset. Reports compliance with low sodium diet. Pertinent negatives include no chest pain, no orthopnea, no palpitations, no PND, no anxiety, no  confusion, no malaise/fatigue, no blurred vision, no headaches, no tinnitus, no peripheral edema, no dizziness, no shortness of breath, no nausea and no vomiting. Medications have helped lower blood pressure. Patient report 1 episode of hypotension and having increased fatigue. Risk factors include a sedentary  lifestyle and hypertension. Hyperlipidemia: This is a chronic problem. The problem occurs constantly. The problem has not changed since onset. Pertinent negatives  include no chest pain, no abdominal pain, no headaches and no shortness of breath. Nothing aggravates the symptoms. Treatments tried: lipitor. Currently on lipitor. Reports compliance with low fat, low cholesterol diet. The treatment provided moderate relief     Anxiety   This is a chronic problem. The problem has been gradually improving. Pertinent negatives include no chest pain, no abdominal  pain, no headaches and no shortness of breath. Stressful situations aggravate the symptoms. Calming techniques and medication relieve the symptoms.  Treatments tried: cymbalta and ativan. The treatment provided moderate relief. Patient lost his  son about 8 years ago. Patient does use half a tablet of lorazepam nightly for sleep. Chronic back pain:  This is a chronic issue. Patient reports having had multiple low back surgical correction. He was recommended for an SI correction surgery. However, due to risk and complication, patient decided not to proceed with procedure. Patient has neuropathy  to bilateral lower legs and feet. Dose for Cymbalta increased to assist with symptom but no improvement in results. Patient reports good control for many years. He also takes ibuprofen as needed. Pertinent negatives include no chest pain, no shortness of breath,  no palpitation, no unilateral or focal weakness, no hematuria, no incontinence of bladder or bowel function. CKD:  Patient reports having kidney issues for sometime. Pt also reports having kidney stones history. He does admit to not drinking as much water as he should. Pt reports no chest pain,  no SOB, no palpitation, no unilateral or focal weakness, no nausea, no vomiting, no diarrhea, no abdominal pain, no incontinence of bladder or bowel functions. Dysphagia/hoarseness:  Patient reports having history of bone spur to his Cspine which triggered some difficulty with swallowing. Patient also reports a history of vocal polyps previously had surgical removal in early 2000. Pt also was present for 9/11 and did inhale dust/abestos, etc during 9/11. He reports having had an episode of epiglottis afterwards which required an admission. Patient was seen and evaluated in the past in [place] by an ENT, no changes in treatment as he had negative barium swallowing a couple of years ago. Pt still has symptoms but they are slightly improved with use of pepcid over the counter. Patient has been offered to be referred to GI and ENT in the past but respectfully declined at that time.   He is interested in seeing specialist groups at this time. Pertinent negatives include no fever or chills, no chest pain, no SOB, no palpitations, no nausea, no vomiting, no GERD, no abdominal pain. Past Medical History, Past Surgical History, Family history, Social History, and Medications were all reviewed with the patient today and updated as necessary. Current Outpatient Medications   Medication Sig Dispense Refill    gabapentin (NEURONTIN) 100 MG capsule Take 1 capsule by mouth 2 times daily for 180 days. Intended supply: 90 days 180 capsule 1    metoprolol succinate (TOPROL XL) 25 MG extended release tablet Take 1 tablet by mouth daily For heart rate 90 tablet 3    azelastine (ASTELIN) 0.1 % nasal spray 1 spray by Nasal route 2 times daily 60 mL 11    doxycycline monohydrate (ADOXA) 100 MG tablet Take 1 tablet by mouth 2 times daily for 7 days (Antibiotic) 14 tablet 0    LORazepam (ATIVAN) 1 MG tablet Take 1/2 to 1 tablet orally up to 2 times daily as needed for anxiety 30 tablet 5    DULoxetine (CYMBALTA) 60 MG extended release capsule Take 1 capsule by mouth daily 90 capsule 3    fluticasone (FLONASE) 50 MCG/ACT nasal spray 2 sprays by Each Nostril route daily 3 each 3    finasteride (PROSCAR) 5 MG tablet Take 1 tablet by mouth daily 90 tablet 3    aspirin 81 MG EC tablet Aspir-81 mg tablet,delayed release   Take 1 tablet every day by oral route. Multiple Vitamins-Minerals (MULTIVITAMIN ADULTS 50+ PO) Multivitamin 50 Plus   ONCE DAILY      VITAMIN D PO Vitamin D   1 QD      ZINC PO zinc   1 QD      atorvastatin (LIPITOR) 40 MG tablet Take 40 mg by mouth daily      ibuprofen (ADVIL;MOTRIN) 600 MG tablet Take 600 mg by mouth 3 times daily       No current facility-administered medications for this visit.      No Known Allergies  Patient Active Problem List   Diagnosis    Coronary atherosclerosis    Wears dentures    Hearing aid worn    History of malignant neoplasm of skin    Long term (current) use of non-steroidal anti-inflammatories (nsaid)    Urolith    Anxiety    Benign neoplasm of colon    Benign prostatic hyperplasia with urinary obstruction    Bilateral tinnitus    Carotid artery stenosis    Diastolic dysfunction    Dyspnea on exertion    Essential hypertension    Gastroesophageal reflux disease    Hearing loss    Hemorrhoids    Herniated lumbar intervertebral disc    Hiatal hernia    Hypercholesterolemia    Low back pain    Neuropathy    Overweight with body mass index (BMI) 25.0-29.9    Raised prostate specific antigen    Spinal stenosis in cervical region    Spinal stenosis of lumbar region    Stage 3 chronic kidney disease ([facility] [address]    Tobacco dependence in remission    Chronic renal disease, stage III ([facility] [address] [187016]     History reviewed. No pertinent past medical history. Past Surgical History:   Procedure Laterality Date    BLADDER REPAIR       Family History   Problem Relation Age of Onset    Alzheimer's Disease Paternal Grandmother     Cancer Sister         Pancreatic    Alzheimer's Disease Father     Cancer Mother         bone    Heart Disease Paternal Grandfather      Social History     Tobacco Use    Smoking status: Former     Types: Cigarettes     Quit date: 1965     Years since quittin.9    Smokeless tobacco: Never   Substance Use Topics    Alcohol use: Not Currently         REVIEW OF SYSTEM    Review of Systems   Constitutional: Negative. Negative for activity change, appetite change, chills, diaphoresis, fatigue, fever and unexpected weight change. HENT:  Positive for congestion, sore throat and voice change. Negative for dental problem, drooling, ear discharge, ear pain, facial swelling, hearing loss, mouth sores, nosebleeds, postnasal drip, rhinorrhea, sinus pressure, sinus pain, sneezing, tinnitus and trouble swallowing. Eyes: Negative. Negative for photophobia, pain, discharge, redness, itching and visual disturbance. Respiratory:  Positive for cough.  Negative for apnea, choking, chest tightness, shortness of breath, wheezing and stridor. Cardiovascular: Negative. Negative for chest pain, palpitations and leg swelling. Gastrointestinal: Negative. Negative for abdominal distention, abdominal pain, anal bleeding, blood in stool, constipation, diarrhea, nausea, rectal pain and vomiting. Endocrine: Negative. Negative for cold intolerance, heat intolerance, polydipsia, polyphagia and polyuria. Genitourinary: Negative. Negative for decreased urine volume, difficulty urinating, dysuria, enuresis, flank pain, frequency, genital sores, hematuria and urgency. Musculoskeletal:  Positive for arthralgias, back pain and myalgias. Negative for gait problem, joint swelling, neck pain and neck stiffness. Skin: Negative. Negative for color change, pallor, rash and wound. Allergic/Immunologic: Negative. Negative for environmental allergies, food allergies and immunocompromised state. Neurological: Negative. Negative for dizziness, tremors, seizures, syncope, facial asymmetry, speech difficulty, weakness, light-headedness, numbness and headaches. Hematological: Negative. Negative for adenopathy. Does not bruise/bleed easily. Psychiatric/Behavioral:  Positive for sleep disturbance. Negative for agitation, behavioral problems, confusion, decreased concentration, dysphoric mood, hallucinations, self-injury and suicidal ideas. The patient is nervous/anxious. The patient is not hyperactive. OBJECTIVE:  /80 (Site: Left Upper Arm, Position: Sitting)   Pulse 81   Wt 197 lb (89.4 kg)   SpO2 99%   BMI 28.27 kg/m²      Physical Exam  Constitutional:       General: He is not in acute distress. Appearance: Normal appearance. He is not ill-appearing, toxic-appearing or diaphoretic. HENT:      Head: Normocephalic and atraumatic.       Right Ear: Tympanic membrane, ear canal and external ear normal.      Left Ear: Tympanic membrane, ear canal and external ear normal.      Nose: Nose normal.      Mouth/Throat:      Mouth: Mucous membranes are moist.      Pharynx: Oropharynx is clear. Eyes:      Extraocular Movements: Extraocular movements intact. Conjunctiva/sclera: Conjunctivae normal.      Pupils: Pupils are equal, round, and reactive to light. Neck:      Vascular: No carotid bruit. Cardiovascular:      Rate and Rhythm: Normal rate and regular rhythm. Pulses: Normal pulses. Heart sounds: Normal heart sounds. No murmur heard. No friction rub. No gallop. Pulmonary:      Effort: Pulmonary effort is normal. No respiratory distress. Breath sounds: Normal breath sounds. No stridor. No wheezing, rhonchi or rales. Chest:      Chest wall: No tenderness. Abdominal:      General: Abdomen is flat. Bowel sounds are normal. There is no distension. Palpations: Abdomen is soft. There is no mass. Tenderness: There is no abdominal tenderness. There is no guarding or rebound. Hernia: No hernia is present. Musculoskeletal:      Cervical back: Normal range of motion and neck supple. No rigidity or tenderness. Lymphadenopathy:      Cervical: No cervical adenopathy. Skin:     General: Skin is warm and dry. Capillary Refill: Capillary refill takes less than 2 seconds. Neurological:      General: No focal deficit present. Mental Status: He is alert and oriented to person, place, and time. Psychiatric:         Mood and Affect: Mood normal.         Behavior: Behavior normal.         Thought Content: Thought content normal.         Judgment: Judgment normal.         Medical problems and test results were reviewed with the patient today.    Lab Results   Component Value Date     11/07/2022    K 4.4 11/07/2022     (H) 11/07/2022    CO2 28 11/07/2022    BUN 26 (H) 11/07/2022    CREATININE 1.30 11/07/2022    GLUCOSE 97 11/07/2022    CALCIUM 9.5 11/07/2022    PROT 6.5 11/07/2022    LABALBU 3.6 11/07/2022    BILITOT 0.7 11/07/2022    ALKPHOS 80 11/07/2022    AST 24 11/07/2022    ALT 26 11/07/2022    LABGLOM 56 (L) 11/07/2022    GFRAA 52 (L) 02/07/2022    AGRATIO 1.7 05/17/2022    GLOB 2.9 11/07/2022       Lab Results   Component Value Date    WBC 8.1 11/07/2022    HGB 14.8 11/07/2022    HCT 45.5 11/07/2022    .0 11/07/2022     11/07/2022     Lab Results   Component Value Date    TSH 1.120 05/17/2022    CUQ0TQF 1.100 11/07/2022     Lab Results   Component Value Date    CHOL 118 11/07/2022    CHOL 110 05/17/2022    CHOL 112 02/07/2022     Lab Results   Component Value Date    TRIG 96 11/07/2022    TRIG 95 05/17/2022    TRIG 77 02/07/2022     Lab Results   Component Value Date    HDL 47 11/07/2022    HDL 38 (L) 05/17/2022    HDL 40 02/07/2022     Lab Results   Component Value Date    LDLCALC 51.8 11/07/2022    LDLCALC 54 05/17/2022    LDLCALC 56 02/07/2022     Lab Results   Component Value Date    LABVLDL 19.2 11/07/2022    VLDL 18 05/17/2022    VLDL 16 02/07/2022     Lab Results   Component Value Date    CHOLHDLRATIO 2.5 11/07/2022     Hemoglobin A1C   Date Value Ref Range Status   11/07/2022 5.4 4.8 - 5.6 % Final            ASSESSMENT and PLAN    1. Dysphagia, unspecified type  -     [facility]    Patient with a longstanding history of dysphagia. History of epiglottitis and vocal cord polypectomy in early 2000. He continues to endorse hoarseness, congestion. He continues to use fluticasone nasal spray but feels that his hoarseness and congestion appears to be worsened when he is working outside. He also continues to have coughing episodes with thin liquids but they are not significant with solid food. He has had barium swallow about 2 years ago for evaluation that showed no abnormalities. Patient has been offered for referral to GI/ENT for evaluation in the past but respectfully declined at that time. He has noticed improvement with use of Pepcid but now not significant.   We have also discussed a repeat in barium swallow for evaluation at this time-patient respectfully declined. Offer for ENT referral-patient agreeable. Referral placed for consideration. We have also discussed GI referral but patient would like to wait at this time due to his history of epiglottitis/vocal cord polyps and would like to see ENT first.    2. History of epiglottitis  -     [facility]    As above. 3. History of vocal cord polypectomy  -     [facility]    As above. 4. Hoarseness  -     [facility]    As above. Etiology may include recurrence of epiglottitis, postnasal drip/congestion, vocal Polyps, GERD, etc.  Patient is using fluticasone nasal spray daily. We will add Astelin nasal spray to assist with postnasal drip treatment. Patient also had COVID about 2 months ago and since then has noted more congestion and worsening in symptom. He also reports increasing in fatigue since having COVID. He has noted increasing congestion if he is working outside more in comparison to being indoors. We will start doxycycline twice daily for 1 week for bacterial treatment. Reviewed risk and side effects of medication including GI upset and increased risk for opportunistic infection such as yeast and C. difficile. Patient was advised to take medication with food and to increase probiotic supplementation (via yogurt, cottage cheese, cultured foods/drinks or OTC probiotic supplements) to avoid any opportunistic infection. We will follow-up in 3 weeks. 5. Nasal congestion  -     azelastine (ASTELIN) 0.1 % nasal spray; 1 spray by Nasal route 2 times daily, Disp-60 mL, R-11Normal  -     doxycycline monohydrate (ADOXA) 100 MG tablet; Take 1 tablet by mouth 2 times daily for 7 days (Antibiotic), Disp-14 tablet, R-0Normal  -     fluticasone (FLONASE) 50 MCG/ACT nasal spray; 2 sprays by Each Nostril route daily, Disp-3 each, R-3Normal    As above.     6. Post-nasal drip  -     azelastine (ASTELIN) 0.1 % nasal spray; 1 spray by Nasal route 2 times daily, Disp-60 mL, R-11Normal  -     fluticasone (FLONASE) 50 MCG/ACT nasal spray; 2 sprays by Each Nostril route daily, Disp-3 each, R-3Normal    As above. 7. Other allergic rhinitis  -     azelastine (ASTELIN) 0.1 % nasal spray; 1 spray by Nasal route 2 times daily, Disp-60 mL, R-11Normal    As above. 8. Tremor of left hand  -     gabapentin (NEURONTIN) 100 MG capsule; Take 1 capsule by mouth 2 times daily for 180 days. Intended supply: 90 days, Disp-180 capsule, R-1Normal    Patient reports progressive worsening of bilateral hand tremors with left worse than right. Patient reports symptom has been present for years but now progressively worsened. He is currently on metoprolol 50 mg daily for his tachycardia history. However, he does report 1 episode of hypotension with systolic blood pressure into the 90 and fatigue. He does have intermittent postural dizziness. Today's blood pressure reading is 116/80. As he is already on a beta-blocker, we will trial a very low-dose of gabapentin to assist with tremor. Review risk and side effects of medication including drowsiness, dizziness, increased risk of constipation, increased risk of injury. Patient was advised not to take medication and drive or operate machinery due to increased risk of injury. Patient was also advised to exercise caution when getting up after taking medication to avoid falls. 9. Essential hypertension  -     metoprolol succinate (TOPROL XL) 25 MG extended release tablet; Take 1 tablet by mouth daily For heart rate, Disp-90 tablet, R-3Normal    As above. Patient did have 1 episode of hypotension and intermittent postural dizziness. Patient does report having decreased sodium intake which may be contributory to his decrease in blood pressure reading. Blood pressure today in office is 116/80. We will have patient decrease metoprolol to 25 mg at this time.   Patient to continue with monitoring his blood pressure once to twice daily and record down blood pressure logs. We will follow-up in 3 weeks. 10. 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 Generalized anxiety disorder  -     LORazepam (ATIVAN) 1 MG tablet; Take 1/2 to 1 tablet orally up to 2 times daily as needed for anxiety, Disp-30 tablet, R-5Normal  -     DULoxetine (CYMBALTA) 60 MG extended release capsule; Take 1 capsule by mouth daily, Disp-90 capsule, R-3Normal    Currently on Cymbalta 60 mg daily for chronic pain management, anxiety and depression management. He does have a prescription for lorazepam as needed to use for anxiety as he does have some difficult time throughout the year due to loss of his son. Per [facility] review, patient with last prescription prescribed over a year ago. We will refill medication for as needed use. Sedation and safety precaution reviewed and discussed with patient. Patient was advised not to utilize lorazepam and gabapentin concurrently due to similar side effects. Orders Placed This Encounter   Procedures    [facility], [facility]     Referral Priority:   Routine     Referral Type:   Eval and Treat     Referral Reason:   Specialty Services Required     Requested Specialty:   Otolaryngology     Number of Visits Requested:   1         Elements of this note have been dictated using speech recognition software. As a result, errors of speech recognition may have occurred. On this date 11/16/2022 I have spent 30 minutes reviewing previous notes, test results and face to face with the patient discussing the diagnosis and importance of compliance with the treatment plan as well as documenting on the day of the visit.  Greater than 50% of this visit was spent counseling the patient about test results, prognosis, importance of compliance, education about disease process, benefits of medications, instructions for management of acute symptoms, and follow up plans. Return in about 3 weeks (around 12/7/2022) for in office for sinus, BP, heart rate, fatigue follow up.      Ida Joshi, APRN - CNP

## 2022-11-17 ASSESSMENT — ENCOUNTER SYMPTOMS
CHEST TIGHTNESS: 0
ALLERGIC/IMMUNOLOGIC NEGATIVE: 1
EYE DISCHARGE: 0
GASTROINTESTINAL NEGATIVE: 1
VOICE CHANGE: 1
SINUS PRESSURE: 0
DIARRHEA: 0
WHEEZING: 0
FACIAL SWELLING: 0
SHORTNESS OF BREATH: 0
BLOOD IN STOOL: 0
ABDOMINAL DISTENTION: 0
EYE PAIN: 0
STRIDOR: 0
SINUS PAIN: 0
EYE ITCHING: 0
EYE REDNESS: 0
NAUSEA: 0
CHOKING: 0
RECTAL PAIN: 0
ANAL BLEEDING: 0
ABDOMINAL PAIN: 0
EYES NEGATIVE: 1
COUGH: 1
CONSTIPATION: 0
SORE THROAT: 1
VOMITING: 0
RHINORRHEA: 0
COLOR CHANGE: 0
BACK PAIN: 1
PHOTOPHOBIA: 0
TROUBLE SWALLOWING: 0
APNEA: 0

## 2022-11-28 ENCOUNTER — OFFICE VISIT (OUTPATIENT)
Dept: ENT CLINIC | Age: 82
End: 2022-11-28
Payer: MEDICARE

## 2022-11-28 VITALS — BODY MASS INDEX: 27.92 KG/M2 | WEIGHT: 195 LBS | HEIGHT: 70 IN

## 2022-11-28 DIAGNOSIS — K21.9 LARYNGOPHARYNGEAL REFLUX (LPR): ICD-10-CM

## 2022-11-28 DIAGNOSIS — J38.7 PRESBYLARYNX: Primary | ICD-10-CM

## 2022-11-28 PROCEDURE — 31575 DIAGNOSTIC LARYNGOSCOPY: CPT | Performed by: OTOLARYNGOLOGY

## 2022-11-28 PROCEDURE — 99204 OFFICE O/P NEW MOD 45 MIN: CPT | Performed by: OTOLARYNGOLOGY

## 2022-11-28 PROCEDURE — 1123F ACP DISCUSS/DSCN MKR DOCD: CPT | Performed by: OTOLARYNGOLOGY

## 2022-11-28 NOTE — PROGRESS NOTES
Rfl:     VITAMIN D PO, Vitamin D  1 QD, Disp: , Rfl:     ZINC PO, zinc  1 QD, Disp: , Rfl:     atorvastatin (LIPITOR) 40 MG tablet, Take 40 mg by mouth daily, Disp: , Rfl:     ibuprofen (ADVIL;MOTRIN) 600 MG tablet, Take 600 mg by mouth 3 times daily, Disp: , Rfl:     History reviewed. No pertinent past medical history. Past Surgical History:   Procedure Laterality Date    BLADDER REPAIR          Family History   Problem Relation Age of Onset    Alzheimer's Disease Paternal Grandmother     Cancer Sister         Pancreatic    Alzheimer's Disease Father     Cancer Mother         bone    Heart Disease Paternal Grandfather         Social History     Socioeconomic History    Marital status:      Spouse name: Yaz Dennis    Number of children: Not on file    Years of education: Not on file    Highest education level: Not on file   Occupational History    Not on file   Tobacco Use    Smoking status: Former     Types: Cigarettes     Quit date: 1965     Years since quittin.9    Smokeless tobacco: Never   Substance and Sexual Activity    Alcohol use: Not Currently    Drug use: Not Currently    Sexual activity: Not on file   Other Topics Concern    Not on file   Social History Narrative    Not on file     Social Determinants of Health     Financial Resource Strain: Low Risk     Difficulty of Paying Living Expenses: Not hard at all   Food Insecurity: No Food Insecurity    Worried About Running Out of Food in the Last Year: Never true    [address] in the Last Year: Never true   Transportation Needs: No Transportation Needs    Lack of Transportation (Medical): No    Lack of Transportation (Non-Medical):  No   Physical Activity: Insufficiently Active    Days of Exercise per Week: 3 days    Minutes of Exercise per Session: 20 min   Stress: Not on file   Social Connections: Not on file   Intimate Partner Violence: Not At Risk    Fear of Current or Ex-Partner: No    Emotionally Abused: No    Physically Abused: No    Sexually Abused: No   Housing Stability: Low Risk     Unable to Pay for Housing in the Last Year: No    Number of Places Lived in the Last Year: 1    Unstable Housing in the Last Year: No        No Known Allergies     ROS:  The patient was asked specifically about the following. General: Fever, chills, night sweats, unexplained weight loss or weight gain  Eyes: Blurry vision, double vision, floaters, loss or decrease of peripheral vision. Ears: Difficulty hearing, ringing or buzzing in the ears, ear fullness, frequent ear infections, ear pain or drainage, hearing aid  Nose/Face: Drainage, frequent nosebleeds, sinus pain, pressure or fullness, difficulty breathing through the nose, facial pain, swelling or masses  Mouth: Sores in mouth, tongue soreness, bleeding gums, wears dentures, growths in mouth  Throat: Sore throat, hoarseness, difficulty swallowing, lump in throat, sore throat frequency  Respiratory: Difficulty breathing, frequent cough, productive cough, wheezing, recent abnormal chest X-RAY  Cardiovascular: Blocked arteries, chest pain, shortness of breath, abnormal heart beat, pacemaker  Digestive: Frequent indigestion, burning in throat,chest or stomach after a meal, burning that wakes you in the night, abdominal pain  Neuropsychiatric: Headaches, seizures, facial numbness or tingling, weakness, depressed mood or feeling sad, anxiety, inability to cope  Hematologic/Lymphatic: Anemia, easy bruising or bleeding, swollen glands, transfusions  Allergy/Immunologic: Hay fever, environmental allergies, food allergies, allergy testing, immunodeficiency  Endocrine: Heat or cold intolerance, fatigue, heart racing, profuse sweating, thyroid swelling, over or underactive thyroid, pituitary problems  Other: other problems not mentioned  All systems were negative with the exception of the following pertinent positives:   Dysphagia  Hoarseness  Ear fullness  Hearing loss  Cough        Vitals:  There were no vitals filed for this visit. PHYSICAL EXAM: A comprehensive physical exam was performed in the following manner. Unless otherwise indicated in pertinent findings section below, findings were within normal limits. APPEARANCE:   General assessment for development status, nutritional status, and for pain or distress was performed. COMMUNICATION:   Ability to communicate effectively including vocal quality was assessed. HEAD AND FACE:   General exam of the face and scalp for any gross masses or lesions was performed. Palpation of the sinuses for any sign of pain or tenderness was performed. Facial nerve examination for any facial mimetic muscle asymmetry at rest and with effort was performed. Palpation of the submandibular and parotid glands was performed to assess for asymmetry, nodule or masses. EYES:   Extraocular motility was assessed for medial, lateral, superior and inferior rectus function as well as inferior and superior oblique function. The conjunctiva and eyelids were examined for injection, pallor or swelling. Pupil reactivity and accomodation was assessed. EARS:   External inspection and palpation of the auricular skin and cartilage was performed for lesion or abnormality. Otoscopy of the external auditory and tympanic membranes was performed to assess for patency, induration, erythema, tympanic membrane health and mobility and the presence of any middle ear fluid or abnormality. Speech reception thresholds were grossly assessed through communication at normal conversational levels. NOSE:   External exam for gross deformity of the nasal bones and upper and lower lateral cartilages was performed. Anterior rhinoscopy was performed to assess the patency of the nasal airway, the anatomy of the nasal septum and turbinates as well as the nasal valve region, and the general mucosal health. The presence of any rhinorrhea and its consistency was noted.    Any abnormalities requiring further evaluation by nasal endoscopy will be described below. MOUTH/PHARYNX/LARYNX:   Assessment of the lips, gums, hard/soft palate, tongue, tonsillar fossae and oropharynx for mass, lesions or mucosal abnormalities was performed. The base of tongue and floor of mouth were inspected for lesions and palpated for mass or nodularity. Mirror exam of the larynx to assess for vocal fold mobility and any gross mass or lesion was performed. Mirror exam of the nasopharynx was attempted, to assess for gross mass or lesion of the nasopharynx or any of adenoidal hyperplasia or inflammation. Any abnormalities requiring further examination by flexible endoscopy will be described below. NECK:   Gross inspection of the neck was performed to assess for mass or asymmetry. Palpation of the level I-IV lymph nodes was performed to assess for any grossly enlarged, or abnormally firm lymphadenopathy. The skin of the neck was examined for any induration or swelling and palpated for any crepitus. The larynx and trachea were palpated to assess position in the neck and continuity. The thyroid was palpated to assess for any mass, nodularity or asymmetry. NEURO/PSYCH:   Cranial nerves II-XII were grossly assessed for any weakness or asymmetry. If indicated, CN I was assessed by administration of a standardized smell test (UPSIT). Orientation to person, place and time was assessed. Mood and affect were assessed. RESPIRATION:   Respiratory effort was assessed for increased work of breathing and inspiratory or expiratory wheezing. Chest expansion was noted for symmetry. CARDIOVASCULAR:   Gross examination for peripheral vascular edema and jugular venous distension was performed. PERTINENT PHYSICAL EXAM FINDINGS - examination for above was grossly within normal limits with exceptions listed below:  Nonobstructive cerumen.   Tympanic membranes visualized and normal.  On anterior rhinoscopy some right-sided nasal airway obstruction noted. This is secondary to septal deviation and spur of the vomer. Mirror exam of the larynx not well tolerated. Therefore flexible fiberoptic examination of the larynx performed. Mild cobblestoning of the posterior pharyngeal mucosa noted. Some anterior displacement of the right pharyngeal soft tissues with pulsations suggesting abnormality of cervical spine. Erythema and edema of the interarytenoid mucosa consistent with reflux. Bilateral vocal folds mobile and without lesions. Studies Reviewed  Referral documentation    PROCEDURES:  DIAGNOSTIC LARYNGOSCOPY:  Informed consent was obtained. The nasal cavity was topicalized with Afrin and Ponticaine sprays. Examination reveals no masses or lesions of the nasopharynx, hypopharynx, or larynx. Bilateral vocal folds are mobile and without lesions. Arytenoids and interarytenoid mucosa erythematous and mildly edematous in appearance. . Nasal findings documented above. ASSESSMENT AND PLAN:     Diagnosis Orders   1. Presbylarynx        2. Laryngopharyngeal reflux (LPR)             Patient with swallowing difficulties likely exacerbated by LPR. Discussed swallowing in relationship to presbylarynx and recommend increased time with mastication and swallowing with intent. Particularly with thin liquids. Recommend thickening of liquids if choking and aspiration continues with thin liquids. Did offer referral to speech and language pathology if symptoms worsen. Patient will consider and call us if he would like to proceed with this. Recommend increase PPI back to 40 mg due to signs of LPR. The patient diagnoses and management plan were discussed at length. They  demonstrated an understanding of the plan and stated that all questions were answered to their satisfaction. Return to ENT clinic as needed.       PATIENT EDUCATION / INSTRUCTIONS GIVEN FOR:  LPR  Presbylarynx  Cervical osteophyte

## 2022-12-07 ENCOUNTER — OFFICE VISIT (OUTPATIENT)
Dept: FAMILY MEDICINE CLINIC | Facility: CLINIC | Age: 82
End: 2022-12-07
Payer: MEDICARE

## 2022-12-07 VITALS
DIASTOLIC BLOOD PRESSURE: 84 MMHG | OXYGEN SATURATION: 99 % | BODY MASS INDEX: 28.55 KG/M2 | HEART RATE: 93 BPM | SYSTOLIC BLOOD PRESSURE: 122 MMHG | WEIGHT: 199 LBS

## 2022-12-07 DIAGNOSIS — R10.10 PAIN OF UPPER ABDOMEN: ICD-10-CM

## 2022-12-07 DIAGNOSIS — I10 ESSENTIAL (PRIMARY) HYPERTENSION: ICD-10-CM

## 2022-12-07 DIAGNOSIS — I25.10 ATHEROSCLEROSIS OF NATIVE CORONARY ARTERY OF NATIVE HEART WITHOUT ANGINA PECTORIS: ICD-10-CM

## 2022-12-07 DIAGNOSIS — I51.89 DIASTOLIC DYSFUNCTION: ICD-10-CM

## 2022-12-07 DIAGNOSIS — R06.09 DYSPNEA ON EXERTION: Primary | ICD-10-CM

## 2022-12-07 DIAGNOSIS — K21.9 GASTROESOPHAGEAL REFLUX DISEASE WITHOUT ESOPHAGITIS: ICD-10-CM

## 2022-12-07 PROCEDURE — 93000 ELECTROCARDIOGRAM COMPLETE: CPT | Performed by: NURSE PRACTITIONER

## 2022-12-07 PROCEDURE — 3074F SYST BP LT 130 MM HG: CPT | Performed by: NURSE PRACTITIONER

## 2022-12-07 PROCEDURE — 99214 OFFICE O/P EST MOD 30 MIN: CPT | Performed by: NURSE PRACTITIONER

## 2022-12-07 PROCEDURE — G8484 FLU IMMUNIZE NO ADMIN: HCPCS | Performed by: NURSE PRACTITIONER

## 2022-12-07 PROCEDURE — 1123F ACP DISCUSS/DSCN MKR DOCD: CPT | Performed by: NURSE PRACTITIONER

## 2022-12-07 PROCEDURE — G8417 CALC BMI ABV UP PARAM F/U: HCPCS | Performed by: NURSE PRACTITIONER

## 2022-12-07 PROCEDURE — 3078F DIAST BP <80 MM HG: CPT | Performed by: NURSE PRACTITIONER

## 2022-12-07 PROCEDURE — 1036F TOBACCO NON-USER: CPT | Performed by: NURSE PRACTITIONER

## 2022-12-07 PROCEDURE — G8427 DOCREV CUR MEDS BY ELIG CLIN: HCPCS | Performed by: NURSE PRACTITIONER

## 2022-12-07 RX ORDER — PANTOPRAZOLE SODIUM 40 MG/1
40 TABLET, DELAYED RELEASE ORAL
Qty: 90 TABLET | Refills: 3 | Status: SHIPPED | OUTPATIENT
Start: 2022-12-07

## 2022-12-07 RX ORDER — METOPROLOL SUCCINATE 50 MG/1
50 TABLET, EXTENDED RELEASE ORAL DAILY
Qty: 90 TABLET | Refills: 3 | Status: SHIPPED | OUTPATIENT
Start: 2022-12-07

## 2022-12-07 RX ORDER — FLUOROURACIL 50 MG/G
CREAM TOPICAL
COMMUNITY
Start: 2022-11-16

## 2022-12-07 NOTE — PROGRESS NOTES
PROGRESS NOTE    Chief Complaint   Patient presents with    Follow-up     Sinus, BP, fatigue follow up. Pt was given gabaentin for tremors but is unable to take due to the side effects. Pt saw ENT and they reported that everything looks great, he was also told to start back taking 1 whole metoprolol due to pulse being too high. Pulse WNL today       SUBJECTIVE:     Sarah Beth Peña is a very pleasant [de-identified] y.o. male with hx of  hypertension, hyperlipidemia, anxiety, bladder cancer and skin cancer,, seen today in office accompanied by his spouse for follow-up. Patient has been seen and evaluated by ENT specialist recently for his dysphagia and hoarseness symptoms. Patient was thought that his swallowing difficulties were likely exacerbated by laryngeal pharyngeal reflux. Patient was asked to take PPI daily, which he has been taking famotidine on a daily basis but still has intermittent symptom where he has to take Tums a couple times during the day. Patient reports that he does have fatigue and intermittent dyspnea with exertion. He also feels upper abdominal distention and discomfort. Mainly discomfort and distention are aggravated with sitting down. He reports that when he stands up, his abdominal distention are better. He reports no chest pain, no palpitation, no edema, no orthopnea, no PND no nausea, no vomiting, no diarrhea, no melena, no hematochezia, no hematemesis, no hemoptysis, no hematuria, no incontinence of bladder or bowel function. Of note, patient was established with cardiology when he was in [place]. He would like to reestablish care with a cardiologist here. Past Medical History, Past Surgical History, Family history, Social History, and Medications were all reviewed with the patient today and updated as necessary.        Current Outpatient Medications   Medication Sig Dispense Refill    fluorouracil (EFUDEX) 5 % cream APPLY TO SCALP TWO TIMES A DAY FOR 2-4 WEEKS THIS FALL AND WINTER metoprolol succinate (TOPROL XL) 50 MG extended release tablet Take 1 tablet by mouth daily 90 tablet 3    pantoprazole (PROTONIX) 40 MG tablet Take 1 tablet by mouth every morning (before breakfast) For stomach reflux 90 tablet 3    azelastine (ASTELIN) 0.1 % nasal spray 1 spray by Nasal route 2 times daily 60 mL 11    LORazepam (ATIVAN) 1 MG tablet Take 1/2 to 1 tablet orally up to 2 times daily as needed for anxiety 30 tablet 5    DULoxetine (CYMBALTA) 60 MG extended release capsule Take 1 capsule by mouth daily 90 capsule 3    fluticasone (FLONASE) 50 MCG/ACT nasal spray 2 sprays by Each Nostril route daily 3 each 3    finasteride (PROSCAR) 5 MG tablet Take 1 tablet by mouth daily 90 tablet 3    aspirin 81 MG EC tablet Aspir-81 mg tablet,delayed release   Take 1 tablet every day by oral route. Multiple Vitamins-Minerals (MULTIVITAMIN ADULTS 50+ PO) Multivitamin 50 Plus   ONCE DAILY      VITAMIN D PO Vitamin D   1 QD      ZINC PO zinc   1 QD      atorvastatin (LIPITOR) 40 MG tablet Take 40 mg by mouth daily      ibuprofen (ADVIL;MOTRIN) 600 MG tablet Take 600 mg by mouth 3 times daily      gabapentin (NEURONTIN) 100 MG capsule Take 1 capsule by mouth 2 times daily for 180 days. Intended supply: 90 days (Patient not taking: Reported on 12/7/2022) 180 capsule 1     No current facility-administered medications for this visit.      No Known Allergies  Patient Active Problem List   Diagnosis    Coronary atherosclerosis    Wears dentures    Hearing aid worn    History of malignant neoplasm of skin    Long term (current) use of non-steroidal anti-inflammatories (nsaid)    Urolith    Anxiety    Benign neoplasm of colon    Benign prostatic hyperplasia with urinary obstruction    Bilateral tinnitus    Carotid artery stenosis    Diastolic dysfunction    Dyspnea on exertion    Essential hypertension    Gastroesophageal reflux disease    Hearing loss    Hemorrhoids    Herniated lumbar intervertebral disc    Hiatal hernia    Hypercholesterolemia    Low back pain    Neuropathy    Overweight with body mass index (BMI) 25.0-29.9    Raised prostate specific antigen    Spinal stenosis in cervical region    Spinal stenosis of lumbar region    Stage 3 chronic kidney disease (HCC)    Tobacco dependence in remission    Ch[facility] [[id number]]     History reviewed. No pertinent past medical history. Past Surgical History:   Procedure Laterality Date    BLADDER REPAIR       Family History   Problem Relation Age of Onset    Alzheimer's Disease Paternal Grandmother     Cancer Sister         Pancreatic    Alzheimer's Disease Father     Cancer Mother         bone    Heart Disease Paternal Grandfather      Social History     Tobacco Use    Smoking status: Former     Types: Cigarettes     Quit date: 1965     Years since quittin.9    Smokeless tobacco: Never   Substance Use Topics    Alcohol use: Not Currently         REVIEW OF SYSTEM    Review of Systems   Constitutional:  Positive for fatigue. Negative for activity change, appetite change, chills, diaphoresis, fever and unexpected weight change. HENT: Negative. Negative for congestion, dental problem, drooling, ear discharge, ear pain, facial swelling, hearing loss, mouth sores, nosebleeds, postnasal drip, rhinorrhea, sinus pressure, sinus pain, sneezing, sore throat, tinnitus, trouble swallowing and voice change. Eyes: Negative. Negative for photophobia, pain, discharge, redness, itching and visual disturbance. Respiratory:  Positive for shortness of breath (with exertion). Negative for apnea, cough, choking, chest tightness, wheezing and stridor. Cardiovascular: Negative. Negative for chest pain, palpitations and leg swelling. Gastrointestinal:  Positive for abdominal distention (upper abdominal). Negative for abdominal pain, anal bleeding, blood in stool, constipation, diarrhea, nausea, rectal pain and vomiting. Endocrine: Negative. Negative for cold intolerance, heat intolerance, polydipsia, polyphagia and polyuria. Genitourinary: Negative. Negative for decreased urine volume, difficulty urinating, dysuria, enuresis, flank pain, frequency, genital sores, hematuria, penile discharge, penile pain, penile swelling, scrotal swelling, testicular pain and urgency. Musculoskeletal: Negative. Negative for arthralgias, back pain, gait problem, joint swelling, myalgias, neck pain and neck stiffness. Skin: Negative. Negative for color change, pallor, rash and wound. Allergic/Immunologic: Negative. Negative for environmental allergies, food allergies and immunocompromised state. Neurological: Negative. Negative for dizziness, tremors, seizures, syncope, facial asymmetry, speech difficulty, weakness, light-headedness, numbness and headaches. Hematological: Negative. Negative for adenopathy. Does not bruise/bleed easily. Psychiatric/Behavioral:  Positive for sleep disturbance (controlled with meds). Negative for agitation, behavioral problems, confusion, decreased concentration, dysphoric mood, hallucinations, self-injury and suicidal ideas. The patient is not nervous/anxious and is not hyperactive. OBJECTIVE:  /84 (Site: Left Upper Arm, Position: Sitting)   Pulse 93   Wt 199 lb (90.3 kg)   SpO2 99%   BMI 28.55 kg/m²      Physical Exam  Constitutional:       General: He is not in acute distress. Appearance: Normal appearance. He is not ill-appearing, toxic-appearing or diaphoretic. HENT:      Head: Normocephalic and atraumatic. Right Ear: Tympanic membrane, ear canal and external ear normal.      Left Ear: Tympanic membrane, ear canal and external ear normal.      Nose: Nose normal.      Mouth/Throat:      Mouth: Mucous membranes are moist.      Pharynx: Oropharynx is clear. Eyes:      Extraocular Movements: Extraocular movements intact.       Conjunctiva/sclera: Conjunctivae normal.      Pupils: Pupils are equal, round, and reactive to light. Neck:      Vascular: No carotid bruit. Cardiovascular:      Rate and Rhythm: Normal rate and regular rhythm. Pulses: Normal pulses. Heart sounds: Normal heart sounds. No murmur heard. No friction rub. No gallop. Pulmonary:      Effort: Pulmonary effort is normal. No respiratory distress. Breath sounds: Normal breath sounds. No stridor. No wheezing, rhonchi or rales. Chest:      Chest wall: No tenderness. Abdominal:      General: Bowel sounds are normal. There is distension (mildly distended with palpation when sitting but softer with standing). There is no abdominal bruit. There are no signs of injury. Palpations: Abdomen is soft. There is no shifting dullness, fluid wave, hepatomegaly, splenomegaly, mass or pulsatile mass. Tenderness: There is no abdominal tenderness. There is no right CVA tenderness, left CVA tenderness, guarding or rebound. Negative signs include Flores's sign, Rovsing's sign, McBurney's sign, psoas sign and obturator sign. Hernia: No hernia is present. Musculoskeletal:      Cervical back: Normal range of motion and neck supple. No rigidity or tenderness. Lymphadenopathy:      Cervical: No cervical adenopathy. Skin:     General: Skin is warm and dry. Capillary Refill: Capillary refill takes less than 2 seconds. Neurological:      General: No focal deficit present. Mental Status: He is alert and oriented to person, place, and time. Psychiatric:         Mood and Affect: Mood normal.         Behavior: Behavior normal.         Thought Content: Thought content normal.         Judgment: Judgment normal.         Medical problems and test results were reviewed with the patient today. 12 Lead ECG    EKG: normal EKG, normal sinus rhythm,normal rate, normal axis and QT, there are no previous tracings available for comparison. No acute ischemic changes noted.      STEFFANY Browning - CNP    Lab Results   Component Value Date     11/07/2022    K 4.4 11/07/2022     (H) 11/07/2022    CO2 28 11/07/2022    BUN 26 (H) 11/07/2022    CREATININE 1.30 11/07/2022    GLUCOSE 97 11/07/2022    CALCIUM 9.5 11/07/2022    PROT 6.5 11/07/2022    LABALBU 3.6 11/07/2022    BILITOT 0.7 11/07/2022    ALKPHOS 80 11/07/2022    AST 24 11/07/2022    ALT 26 11/07/2022    LABGLOM 56 (L) 11/07/2022    GFRAA 52 (L) 02/07/2022    AGRATIO 1.7 05/17/2022    GLOB 2.9 11/07/2022       Lab Results   Component Value Date    WBC 8.1 11/07/2022    HGB 14.8 11/07/2022    HCT 45.5 11/07/2022    .0 11/07/2022     11/07/2022     Lab Results   Component Value Date    CHOL 118 11/07/2022    CHOL 110 05/17/2022    CHOL 112 02/07/2022     Lab Results   Component Value Date    TRIG 96 11/07/2022    TRIG 95 05/17/2022    TRIG 77 02/07/2022     Lab Results   Component Value Date    HDL 47 11/07/2022    HDL 38 (L) 05/17/2022    HDL 40 02/07/2022     Lab Results   Component Value Date    LDLCALC 51.8 11/07/2022    LDLCALC 54 05/17/2022    LDLCALC 56 02/07/2022     Lab Results   Component Value Date    LABVLDL 19.2 11/07/2022    VLDL 18 05/17/2022    VLDL 16 02/07/2022     Lab Results   Component Value Date    CHOLHDLRATIO 2.5 11/07/2022       ASSESSMENT and PLAN    1. Dyspnea on exertion  -     EKG 12 Lead; Future  -     [address]    Upon record review, patient was established with cardiology when he was in [place] for tachycardia, atherosclerosis and diastolic dysfunction. Patient reports noticing increasing in dyspnea with exertion and he also noticed occasional sensation of \"catching his breath\" with a quick inhale. Patient reports that he feels that his upper abdomen and distention may be contributory. He reports no active chest pain or palpitation sensation. He reports no wheezing or chest tightness. He reports no coughing or orthopnea. EKG showed no acute ischemia.   Referral placed to cardiology to establish care. 2. Atherosclerosis of native coronary artery of native heart without angina pectoris  -     [address]    Most recent lipid panel completed in November showed LDL at goal of 51.8. Currently on atorvastatin. We will have patient continue current therapy. 3. Diastolic dysfunction  -     [address]    As above. Referral placed to cardiology for consideration. 4. Pain of upper abdomen  -     CT ABDOMEN PELVIS W IV CONTRAST Additional Contrast? Radiologist Recommendation; Future    Patient with intermittent discomfort and upper abdominal distention. He is concerned for abdominal mass. Order placed for CT abdomen pelvis for evaluation. We have also discussed referral to GI specialist for further evaluation. Patient has an upcoming appointment with his dermatologist for squamous cell carcinoma removal and treatment in 1 to 2 weeks. He would like to have those treatment completed first and agrees to let me know if he wishes to proceed with GI evaluation in the future. 5. Gastroesophageal reflux disease without esophagitis  -     pantoprazole (PROTONIX) 40 MG tablet; Take 1 tablet by mouth every morning (before breakfast) For stomach reflux, Disp-90 tablet, R-3Normal    Currently on famotidine daily but still having symptoms to where he has to take Tums OTC for symptom management. We will have patient stop famotidine. Start pantoprazole. Again referral to GI was discussed but patient would like to wait at this time but agrees to let me know if he wishes to proceed in the future. 6. Essential (primary) hypertension  -     metoprolol succinate (TOPROL XL) 50 MG extended release tablet; Take 1 tablet by mouth daily, Disp-90 tablet, R-3Normal  -     [address]    Patient with blood pressure of 122/84 today in office.   Patient had episode of orthostatic hypotension and dose was decreased to 25 mg

## 2022-12-08 RX ORDER — METOPROLOL SUCCINATE 50 MG/1
TABLET, EXTENDED RELEASE ORAL
Qty: 90 TABLET | Refills: 2 | OUTPATIENT
Start: 2022-12-08

## 2022-12-08 ASSESSMENT — ENCOUNTER SYMPTOMS
EYES NEGATIVE: 1
PHOTOPHOBIA: 0
STRIDOR: 0
VOICE CHANGE: 0
EYE PAIN: 0
NAUSEA: 0
CHOKING: 0
COLOR CHANGE: 0
VOMITING: 0
EYE DISCHARGE: 0
ABDOMINAL PAIN: 0
BLOOD IN STOOL: 0
SINUS PAIN: 0
RHINORRHEA: 0
RECTAL PAIN: 0
TROUBLE SWALLOWING: 0
CHEST TIGHTNESS: 0
SINUS PRESSURE: 0
WHEEZING: 0
EYE ITCHING: 0
BACK PAIN: 0
EYE REDNESS: 0
ANAL BLEEDING: 0
COUGH: 0
APNEA: 0
ABDOMINAL DISTENTION: 1
FACIAL SWELLING: 0
SORE THROAT: 0
ALLERGIC/IMMUNOLOGIC NEGATIVE: 1
CONSTIPATION: 0
DIARRHEA: 0
SHORTNESS OF BREATH: 1

## 2022-12-14 ENCOUNTER — HOSPITAL ENCOUNTER (OUTPATIENT)
Dept: CT IMAGING | Age: 82
Discharge: HOME | End: 2022-12-17

## 2022-12-14 DIAGNOSIS — F41.1 GENERALIZED ANXIETY DISORDER: ICD-10-CM

## 2022-12-14 DIAGNOSIS — R10.10 PAIN OF UPPER ABDOMEN: ICD-10-CM

## 2022-12-15 RX ORDER — DULOXETIN HYDROCHLORIDE 60 MG/1
60 CAPSULE, DELAYED RELEASE ORAL DAILY
Qty: 90 CAPSULE | Refills: 3 | OUTPATIENT
Start: 2022-12-15

## 2023-02-01 ENCOUNTER — INITIAL CONSULT (OUTPATIENT)
Dept: CARDIOLOGY CLINIC | Age: 83
End: 2023-02-01
Payer: MEDICARE

## 2023-02-01 VITALS
SYSTOLIC BLOOD PRESSURE: 114 MMHG | WEIGHT: 198.4 LBS | HEART RATE: 73 BPM | DIASTOLIC BLOOD PRESSURE: 68 MMHG | HEIGHT: 70 IN | BODY MASS INDEX: 28.4 KG/M2

## 2023-02-01 DIAGNOSIS — I49.3 PVC (PREMATURE VENTRICULAR CONTRACTION): ICD-10-CM

## 2023-02-01 DIAGNOSIS — E78.00 HYPERCHOLESTEROLEMIA: ICD-10-CM

## 2023-02-01 DIAGNOSIS — R01.1 MURMUR: ICD-10-CM

## 2023-02-01 DIAGNOSIS — I10 ESSENTIAL HYPERTENSION: Primary | ICD-10-CM

## 2023-02-01 PROCEDURE — G8484 FLU IMMUNIZE NO ADMIN: HCPCS | Performed by: INTERNAL MEDICINE

## 2023-02-01 PROCEDURE — G8427 DOCREV CUR MEDS BY ELIG CLIN: HCPCS | Performed by: INTERNAL MEDICINE

## 2023-02-01 PROCEDURE — 99203 OFFICE O/P NEW LOW 30 MIN: CPT | Performed by: INTERNAL MEDICINE

## 2023-02-01 PROCEDURE — 1123F ACP DISCUSS/DSCN MKR DOCD: CPT | Performed by: INTERNAL MEDICINE

## 2023-02-01 PROCEDURE — 3078F DIAST BP <80 MM HG: CPT | Performed by: INTERNAL MEDICINE

## 2023-02-01 PROCEDURE — G8417 CALC BMI ABV UP PARAM F/U: HCPCS | Performed by: INTERNAL MEDICINE

## 2023-02-01 PROCEDURE — 3074F SYST BP LT 130 MM HG: CPT | Performed by: INTERNAL MEDICINE

## 2023-02-01 PROCEDURE — 1036F TOBACCO NON-USER: CPT | Performed by: INTERNAL MEDICINE

## 2023-02-01 PROCEDURE — 93000 ELECTROCARDIOGRAM COMPLETE: CPT | Performed by: INTERNAL MEDICINE

## 2023-02-01 RX ORDER — ACETAMINOPHEN 500 MG
500 TABLET ORAL EVERY 6 HOURS PRN
COMMUNITY

## 2023-02-01 RX ORDER — VITAMIN B COMPLEX
1 CAPSULE ORAL DAILY
COMMUNITY

## 2023-02-01 RX ORDER — ATORVASTATIN CALCIUM 40 MG/1
TABLET, FILM COATED ORAL
Qty: 90 TABLET | Refills: 3 | Status: SHIPPED | OUTPATIENT
Start: 2023-02-01

## 2023-02-01 ASSESSMENT — PATIENT HEALTH QUESTIONNAIRE - PHQ9
SUM OF ALL RESPONSES TO PHQ QUESTIONS 1-9: 0
SUM OF ALL RESPONSES TO PHQ9 QUESTIONS 1 & 2: 0
SUM OF ALL RESPONSES TO PHQ QUESTIONS 1-9: 0
SUM OF ALL RESPONSES TO PHQ QUESTIONS 1-9: 0
2. FEELING DOWN, DEPRESSED OR HOPELESS: 0
SUM OF ALL RESPONSES TO PHQ QUESTIONS 1-9: 0
1. LITTLE INTEREST OR PLEASURE IN DOING THINGS: 0

## 2023-02-01 ASSESSMENT — ENCOUNTER SYMPTOMS
SHORTNESS OF BREATH: 0
HEMATEMESIS: 0
COLOR CHANGE: 0
BOWEL INCONTINENCE: 0
HOARSE VOICE: 0
BACK PAIN: 1
DIARRHEA: 0
HEMATOCHEZIA: 0
SPUTUM PRODUCTION: 0
ORTHOPNEA: 0
WHEEZING: 0
BLURRED VISION: 0
ABDOMINAL PAIN: 0

## 2023-02-01 NOTE — PROGRESS NOTES
[facility] CARDIOLOGY  [address], [address]  PHONE: [phone]        23    NAME:  Joellen Carcamo  : 1940  MRN: [id number]       SUBJECTIVE:   Joellen Carcamo is a 81 y.o. male seen for a consultation visit regarding the following: The patient is referred by Ms Bambi Stevens to re establish cardiology follow up. He states that he was followed by cardiology in [place] due to unspecified tachycardia and \"regurgitant heart valve\". Presently,he reports worsening easy fatigue,dyspnea,and occasional light headedness since COVID infection in 2022. Chief Complaint   Patient presents with    Consultation    Hypertension    Shortness of Breath    Coronary Artery Disease            HPI:  Consultation is requested by [unfilled] for evaluation of Consultation, Hypertension, Shortness of Breath, and Coronary Artery Disease   . Hypertension  This is a chronic problem. The problem is unchanged. The problem is controlled. Associated symptoms include malaise/fatigue. Pertinent negatives include no anxiety, blurred vision, chest pain, headaches, neck pain, orthopnea, palpitations, peripheral edema, PND, shortness of breath or sweats. Past Medical History, Past Surgical History, Family history, Social History, and Medications were all reviewed with the patient today and updated as necessary.        No Known Allergies    Current Outpatient Medications:     b complex vitamins capsule, Take 1 capsule by mouth daily, Disp: , Rfl:     acetaminophen (TYLENOL) 500 MG tablet, Take 500 mg by mouth every 6 hours as needed for Pain, Disp: , Rfl:     fluorouracil (EFUDEX) 5 % cream, APPLY TO SCALP TWO TIMES A DAY FOR 2-4 WEEKS THIS  AND WINTER, Disp: , Rfl:     metoprolol succinate (TOPROL XL) 50 MG extended release tablet, Take 1 tablet by mouth daily, Disp: 90 tablet, Rfl: 3    azelastine (ASTELIN) 0.1 % nasal spray, 1 spray by Nasal route 2 times daily, Disp: 60 mL, Rfl: 11 LORazepam (ATIVAN) 1 MG tablet, Take 1/2 to 1 tablet orally up to 2 times daily as needed for anxiety, Disp: 30 tablet, Rfl: 5    DULoxetine (CYMBALTA) 60 MG extended release capsule, Take 1 capsule by mouth daily, Disp: 90 capsule, Rfl: 3    finasteride (PROSCAR) 5 MG tablet, Take 1 tablet by mouth daily, Disp: 90 tablet, Rfl: 3    aspirin 81 MG EC tablet, Aspir-81 mg tablet,delayed release  Take 1 tablet every day by oral route., Disp: , Rfl:     Multiple Vitamins-Minerals (MULTIVITAMIN ADULTS 50+ PO), Multivitamin 50 Plus  ONCE DAILY, Disp: , Rfl:     VITAMIN D PO, Vitamin D  1 QD, Disp: , Rfl:     ZINC PO, zinc  1 QD, Disp: , Rfl:     atorvastatin (LIPITOR) 40 MG tablet, Take 40 mg by mouth daily, Disp: , Rfl:     ibuprofen (ADVIL;MOTRIN) 600 MG tablet, Take 600 mg by mouth 3 times daily, Disp: , Rfl:   Past Medical History:   Diagnosis Date    [facility]     Bladder cancer    Hyperlipidemia     Hypertension      Past Surgical History:   Procedure Laterality Date    BACK SURGERY      Back surgery x 3    BLADDER REPAIR       Family History   Problem Relation Age of Onset    Cancer Mother         bone    Alzheimer's Disease Father     Cancer Sister         Pancreatic    Alzheimer's Disease Paternal Grandmother     Heart Disease Paternal Grandfather      Social History     Tobacco Use    Smoking status: Former     Types: Cigarettes     Quit date: 1965     Years since quittin.1    Smokeless tobacco: Never   Substance Use Topics    Alcohol use: Not Currently       ROS:    Review of Systems   Constitutional: Positive for malaise/fatigue. Negative for chills, decreased appetite, diaphoresis and fever. HENT:  Negative for congestion, hearing loss, hoarse voice and nosebleeds. Eyes:  Negative for blurred vision. Cardiovascular:  Positive for dyspnea on exertion.  Negative for chest pain, claudication, cyanosis, irregular heartbeat, leg swelling, near-syncope, orthopnea, palpitations, paroxysmal nocturnal dyspnea and syncope. Respiratory:  Negative for shortness of breath, sputum production and wheezing. Endocrine: Negative for polydipsia, polyphagia and polyuria. Skin:  Negative for color change. Musculoskeletal:  Positive for back pain. Negative for neck pain. Gastrointestinal:  Negative for abdominal pain, bowel incontinence, diarrhea, hematemesis and hematochezia. Genitourinary:  Negative for dysuria, frequency and hematuria. Neurological:  Positive for paresthesias (lower extremity neuropathy) and tremors. Negative for focal weakness, headaches, light-headedness, loss of balance, numbness, sensory change and weakness. Psychiatric/Behavioral:  Negative for altered mental status and memory loss. PHYSICAL EXAM:   /68   Pulse 73   Ht 5' 10\" (1.778 m)   Wt 198 lb 6.4 oz (90 kg) Comment: with shoes  BMI 28.47 kg/m²      Physical Exam  Constitutional:       Appearance: Normal appearance. HENT:      Head: Normocephalic and atraumatic. Nose: Nose normal.   Eyes:      Extraocular Movements: Extraocular movements intact. Pupils: Pupils are equal, round, and reactive to light. Neck:      Vascular: No carotid bruit. Cardiovascular:      Rate and Rhythm: Regular rhythm. Pulses: Normal pulses. Heart sounds: No murmur heard. Comments: Occasional extrasystole. Distant heart sounds  Pulmonary:      Effort: Pulmonary effort is normal.      Breath sounds: Normal breath sounds. Abdominal:      General: Abdomen is flat. Bowel sounds are normal.      Palpations: Abdomen is soft. Musculoskeletal:         General: Normal range of motion. Cervical back: Normal range of motion and neck supple. Skin:     General: Skin is warm and dry. Neurological:      General: No focal deficit present. Mental Status: He is alert and oriented to person, place, and time.    Psychiatric:         Mood and Affect: Mood normal.       Medical problems and test results were reviewed with the patient today. Results for orders placed or performed in visit on 02/01/23   EKG 12 Lead - Clinic Performed    Impression    Sinus  Rhythm  - frequent ectopic ventricular beat s   # VECs = 3  -Left axis -anterior fascicular block. ABNORMAL          No results found for this or any previous visit (from the past 672 hour(s)). Lab Results   Component Value Date/Time    CHOL 118 11/07/2022 08:25 AM    HDL 47 11/07/2022 08:25 AM    VLDL 18 05/17/2022 03:27 AM       ASSESSMENT and PLAN    Madhu Galan was seen today for consultation, hypertension, shortness of breath and coronary artery disease. Diagnoses and all orders for this visit:    Essential hypertension:Stable and at goal.Continue Toprol. -     EKG 12 Lead - Clinic Performed    Murmur  -     Transthoracic echocardiogram (TTE) complete with contrast, bubble, strain, and 3D PRN; Future    PVC (premature ventricular contraction)  -     Transthoracic echocardiogram (TTE) complete with contrast, bubble, strain, and 3D PRN; Future  -     Extended cardiac holter monitor (48 hrs - 15 days); Future    Hypercholesterolemia:Followed by PCP. Continue Lipitor      Disposition:  Return for Follow up after procedure, Follow up after Echo. Thank you for allowing me to participate in this patient's care. Please call or contact me if there are any questions or concerns regarding the above.       Sydell Mcardle, MD  02/01/23  12:47 PM

## 2023-02-07 ENCOUNTER — APPOINTMENT (RX ONLY)
Dept: URBAN - METROPOLITAN AREA CLINIC 330 | Facility: CLINIC | Age: 83
Setting detail: DERMATOLOGY
End: 2023-02-07

## 2023-02-07 PROBLEM — C44.519 BASAL CELL CARCINOMA OF SKIN OF OTHER PART OF TRUNK: Status: ACTIVE | Noted: 2023-02-07

## 2023-02-07 PROCEDURE — ? COUNSELING

## 2023-02-07 PROCEDURE — ? CURETTAGE AND DESTRUCTION

## 2023-02-07 PROCEDURE — 17263 DSTRJ MAL LES T/A/L 2.1-3.0: CPT

## 2023-02-07 NOTE — PROCEDURE: CURETTAGE AND DESTRUCTION
Size Of Lesion In Cm: 2.6
Add Intralesional Injection: No
Concentration (Mg/Ml Or Millions Of Plaque Forming Units/Cc): 0.01
Bill As A Line Item Or As Units: Line Item
Post-Care Instructions: I reviewed with the patient in detail post-care instructions. Patient is to keep the area dry for 48 hours, and not to engage in any swimming until the area is healed. Should the patient develop any fevers, chills, bleeding, severe pain patient will contact the office immediately.
Consent was obtained from the patient. The risks, benefits and alternatives to therapy were discussed in detail. Specifically, the risks of infection, scarring, bleeding, prolonged wound healing, nerve injury, incomplete removal, allergy to anesthesia and recurrence were addressed. Alternatives to ED&C, such as: surgical removal and XRT were also discussed.  Prior to the procedure, the treatment site was clearly identified and confirmed by the patient. All components of Universal Protocol/PAUSE Rule completed.
What Was Performed First?: Curettage
Additional Information: (Optional): The wound was cleaned, and a pressure dressing was applied.  The patient received detailed post-op instructions.
Number Of Curettages: 3
Cautery Type: electrodesiccation
Anesthesia Type: 1% lidocaine with 1:100,000 epinephrine and a 1:10 solution of 8.4% sodium bicarbonate
Total Volume (Ccs): 1
Final Size Statement: The size of the lesion after curettage was
Detail Level: Detailed

## 2023-02-22 ENCOUNTER — TELEPHONE (OUTPATIENT)
Dept: CARDIOLOGY CLINIC | Age: 83
End: 2023-02-22

## 2023-02-22 NOTE — TELEPHONE ENCOUNTER
Spoke with [employer] from [place]. Stated pt had run of SVT at heart rate of 191 for 60 seconds on 02-06-23. Report downloaded into Dr. Bolivar Krishnamurthy work basket and into pt's chart. Last seen as a consult on 02-01-23 per Dr. Hedy Turner. Has history of unspecified tachycardia per note.  Will informed Dr. Hedy Turner in the am. Clark Cottrell

## 2023-03-08 ENCOUNTER — OFFICE VISIT (OUTPATIENT)
Dept: CARDIOLOGY CLINIC | Age: 83
End: 2023-03-08

## 2023-03-08 VITALS
WEIGHT: 194.5 LBS | BODY MASS INDEX: 27.84 KG/M2 | SYSTOLIC BLOOD PRESSURE: 110 MMHG | HEART RATE: 68 BPM | HEIGHT: 70 IN | DIASTOLIC BLOOD PRESSURE: 60 MMHG

## 2023-03-08 DIAGNOSIS — I49.3 PVC (PREMATURE VENTRICULAR CONTRACTION): Primary | ICD-10-CM

## 2023-03-08 DIAGNOSIS — R01.1 MURMUR: ICD-10-CM

## 2023-03-08 DIAGNOSIS — I10 ESSENTIAL HYPERTENSION: ICD-10-CM

## 2023-03-08 DIAGNOSIS — I47.1 PSVT (PAROXYSMAL SUPRAVENTRICULAR TACHYCARDIA) (HCC): ICD-10-CM

## 2023-03-08 PROBLEM — I47.10 PSVT (PAROXYSMAL SUPRAVENTRICULAR TACHYCARDIA): Status: ACTIVE | Noted: 2023-03-08

## 2023-03-08 ASSESSMENT — ENCOUNTER SYMPTOMS
HEMATEMESIS: 0
BLURRED VISION: 0
COLOR CHANGE: 0
ABDOMINAL PAIN: 0
BOWEL INCONTINENCE: 0
ORTHOPNEA: 0
HOARSE VOICE: 0
HEMATOCHEZIA: 0
WHEEZING: 0
SHORTNESS OF BREATH: 0
DIARRHEA: 0
SPUTUM PRODUCTION: 0

## 2023-03-08 NOTE — PROGRESS NOTES
[facility] CARDIOLOGY  [address], [address]  [facility], [address] [place] [address]  PHONE: [phone]        23        NAME:  Oswaldo Farnsworth  : 1940  MRN: [id number]       SUBJECTIVE:   Oswaldo Farnsworth is a 81 y.o. male seen for a follow up visit regarding the following: Recently,the patient moved from [place] to [facility] . He was referred to re establish cardiology follow up. He reported a hx of unspecified tachycardia and \"leaky heart valve\". His EKG showed frequent PVC's. Follow up extended holter and echo were ordered. He returns for follow up discussion of test results. Echo showed normal LV EF (57%) with mild mild to moderate OR. 2 week holter monitor revealed NSR,rare PAC's and PSVT,and occasional PVC's ( 1.8%). I discussed test results with the patient and his wife. Raiza Ochoa reports feeling well. Chief Complaint   Patient presents with    Results     Echo and Monitor    Hypertension    Hyperlipidemia       HPI:    Hypertension  This is a chronic problem. The problem is unchanged. The problem is controlled. Pertinent negatives include no anxiety, blurred vision, chest pain, headaches, malaise/fatigue, neck pain, orthopnea, palpitations, peripheral edema, PND, shortness of breath or sweats. Hyperlipidemia  Pertinent negatives include no chest pain, focal weakness or shortness of breath. Past Medical History, Past Surgical History, Family history, Social History, and Medications were all reviewed with the patient today and updated as necessary.          Current Outpatient Medications:     atorvastatin (LIPITOR) 40 MG tablet, TAKE ONE TABLET BY MOUTH ONE TIME DAILY, Disp: 90 tablet, Rfl: 3    b complex vitamins capsule, Take 1 capsule by mouth daily, Disp: , Rfl:     acetaminophen (TYLENOL) 500 MG tablet, Take 500 mg by mouth every 6 hours as needed for Pain, Disp: , Rfl:     metoprolol succinate (TOPROL XL) 50 MG extended release tablet, Take 1 tablet by mouth daily, Disp: 90 tablet, Rfl: 3    azelastine (ASTELIN) 0.1 % nasal spray, 1 spray by Nasal route 2 times daily, Disp: 60 mL, Rfl: 11    LORazepam (ATIVAN) 1 MG tablet, Take 1/2 to 1 tablet orally up to 2 times daily as needed for anxiety, Disp: 30 tablet, Rfl: 5    DULoxetine (CYMBALTA) 60 MG extended release capsule, Take 1 capsule by mouth daily, Disp: 90 capsule, Rfl: 3    finasteride (PROSCAR) 5 MG tablet, Take 1 tablet by mouth daily, Disp: 90 tablet, Rfl: 3    aspirin 81 MG EC tablet, Aspir-81 mg tablet,delayed release  Take 1 tablet every day by oral route., Disp: , Rfl:     Multiple Vitamins-Minerals (MULTIVITAMIN ADULTS 50+ PO), Multivitamin 50 Plus  ONCE DAILY, Disp: , Rfl:     VITAMIN D PO, Vitamin D  1 QD, Disp: , Rfl:     ZINC PO, zinc  1 QD, Disp: , Rfl:     ibuprofen (ADVIL;MOTRIN) 600 MG tablet, Take 600 mg by mouth 3 times daily, Disp: , Rfl:     fluorouracil (EFUDEX) 5 % cream, APPLY TO SCALP TWO TIMES A DAY FOR 2-4 WEEKS THIS  AND WINTER (Patient not taking: Reported on 3/8/2023), Disp: , Rfl:   No Known Allergies  Past Medical History:   Diagnosis Date    [facility] ([facility]     Bladder cancer    Hyperlipidemia     Hypertension     [facility] 3/8/2023     Past Surgical History:   Procedure Laterality Date    BACK SURGERY      Back surgery x 3    BLADDER REPAIR       Family History   Problem Relation Age of Onset    Cancer Mother         bone    Alzheimer's Disease Father     Cancer Sister         Pancreatic    Alzheimer's Disease Paternal Grandmother     Heart Disease Paternal Grandfather       Social History     Tobacco Use    Smoking status: Former     Types: Cigarettes     Quit date: 1965     Years since quittin.2    Smokeless tobacco: Never   Substance Use Topics    Alcohol use: Not Currently       ROS:    Review of Systems   Constitutional: Negative for chills, decreased appetite, diaphoresis, fever and malaise/fatigue.    HENT:  Negative for congestion, hearing loss, hoarse voice and nosebleeds. Eyes:  Negative for blurred vision. Cardiovascular:  Negative for chest pain, claudication, cyanosis, dyspnea on exertion, irregular heartbeat, leg swelling, near-syncope, orthopnea, palpitations, paroxysmal nocturnal dyspnea and syncope. Respiratory:  Negative for shortness of breath, sputum production and wheezing. Endocrine: Negative for polydipsia, polyphagia and polyuria. Skin:  Negative for color change. Musculoskeletal:  Negative for neck pain. Gastrointestinal:  Negative for abdominal pain, bowel incontinence, diarrhea, hematemesis and hematochezia. Genitourinary:  Negative for dysuria, frequency and hematuria. Neurological:  Negative for focal weakness, headaches, light-headedness, loss of balance, numbness, sensory change and weakness. Psychiatric/Behavioral:  Negative for altered mental status and memory loss. PHYSICAL EXAM:   /60   Pulse 68   Ht 5' 10\" (1.778 m)   Wt 194 lb 8 oz (88.2 kg)   BMI 27.91 kg/m²      Physical Exam  Constitutional:       Appearance: Normal appearance. HENT:      Head: Normocephalic and atraumatic. Nose: Nose normal.   Eyes:      Extraocular Movements: Extraocular movements intact. Pupils: Pupils are equal, round, and reactive to light. Neck:      Vascular: No carotid bruit. Cardiovascular:      Rate and Rhythm: Regular rhythm. Pulses: Normal pulses. Heart sounds: No murmur heard. Pulmonary:      Effort: Pulmonary effort is normal.      Breath sounds: Normal breath sounds. Abdominal:      General: Abdomen is flat. Bowel sounds are normal.      Palpations: Abdomen is soft. Musculoskeletal:         General: Normal range of motion. Cervical back: Normal range of motion and neck supple. Skin:     General: Skin is warm and dry. Neurological:      General: No focal deficit present. Mental Status: He is alert and oriented to person, place, and time.    Psychiatric:         Mood and Affect: Mood normal.       Medical problems and test results were reviewed with the patient today.      Recent Results (from the past 672 hour(s))   Transthoracic echocardiogram (TTE) complete with contrast, bubble, strain, and 3D PRN    Collection Time: 02/16/23 11:00 AM   Result Value Ref Range    LV EDV A2C 48 mL    LV EDV A4C 55 mL    LV ESV A2C 19 mL    LV ESV A4C 28 mL    IVSd 1.3 (A) 0.6 - 1.0 cm    LVIDd 4.6 4.2 - 5.9 cm    LVIDs 3.4 cm    LVOT Peak Velocity 0.8 m/s    LVOT Peak Gradient 3 mmHg    LVPWd 1.1 (A) 0.6 - 1.0 cm    LV E' Lateral Velocity 10 cm/s    LV E' Septal Velocity 7 cm/s    LV Ejection Fraction A2C 60 %    LV Ejection Fraction A4C 50 %    EF BP 57 55 - 100 %    LA Minor Axis 3.1 cm    LA Major [place] 4.0 cm    LA Area 2C 6.3 cm2    LA Area 4C 12.1 cm2    LA Volume 2C 11 (A) 18 - 58 mL    LA Volume 4C 29 18 - 58 mL    LA Diameter 3.5 cm    AV Peak Velocity 1.4 m/s    AV Peak Gradient 8 mmHg    Aortic Root 3.6 cm    Ascending Aorta 3.7 cm    Aortic Sinus Valsalva 3.8 cm    MV E Wave Deceleration Time 243.0 ms    MV A Velocity 0.89 m/s    MV E Velocity 0.59 m/s    RVIDd 3.3 cm    TAPSE 3.2 1.7 cm    Body Surface Area 2.11 m2    Fractional Shortening 2D 26 28 - 44 %    LV ESV Index A4C 13 mL/m2    LV EDV Index A4C 26 mL/m2    LV ESV Index A2C 9 mL/m2    LV EDV Index A2C 23 mL/m2    LVIDd Index 2.21 cm/m2    LVIDs Index 1.63 cm/m2    LV RWT Ratio 0.48     LV Mass 2D 205.0 88 - 224 g    LV Mass 2D Index 98.6 49 - 115 g/m2    MV E/A 0.66     E/E' Ratio (Averaged) 7.16     E/E' Lateral 5.90     E/E' Septal 8.43     LA Volume Index 2C 5 (A) 16 - 34 mL/m2    LA Volume Index 4C 14 (A) 16 - 34 mL/m2    LA Size Index 1.68 cm/m2    LA/AO Root Ratio 0.97     Ao Root Index 1.73 cm/m2    Aortic Sinus Valsalva Index 1.83 cm/m2    Ascending Aorta Index 1.78 cm/m2    AV Velocity Ratio 0.57     Left Ventricular Ejection Fraction 57     LVEF MODALITY ECHO      Lab Results   Component Value Date/Time    CHOL 118 11/07/2022 08:25 AM    HDL 47 11/07/2022 08:25 AM    VLDL 18 05/17/2022 03:27 AM     No results found for any visits on 03/08/23. ASSESSMENT and PLAN    John Thomas was seen today for results, hypertension and hyperlipidemia. Diagnoses and all orders for this visit:    PVC (premature ventricular contraction):Stable. Continue Toprol. Essential hypertension:Stable and at goal.Continue Toprol. PSVT (paroxysmal supraventricular tachycardia) (HCC):Stable. Continue Toprol. Murmur:Echo showed mild to moderate [place]. Disposition:    Return in about 6 months (around 9/8/2023).                 Anish Laguna MD  3/8/2023  12:13 PM

## 2023-05-11 ENCOUNTER — APPOINTMENT (RX ONLY)
Dept: URBAN - METROPOLITAN AREA CLINIC 330 | Facility: CLINIC | Age: 83
Setting detail: DERMATOLOGY
End: 2023-05-11

## 2023-05-11 DIAGNOSIS — D22 MELANOCYTIC NEVI: ICD-10-CM | Status: STABLE

## 2023-05-11 DIAGNOSIS — L57.0 ACTINIC KERATOSIS: ICD-10-CM

## 2023-05-11 DIAGNOSIS — Z85.828 PERSONAL HISTORY OF OTHER MALIGNANT NEOPLASM OF SKIN: ICD-10-CM

## 2023-05-11 DIAGNOSIS — L85.3 XEROSIS CUTIS: ICD-10-CM

## 2023-05-11 DIAGNOSIS — D69.2 OTHER NONTHROMBOCYTOPENIC PURPURA: ICD-10-CM

## 2023-05-11 PROBLEM — D22.5 MELANOCYTIC NEVI OF TRUNK: Status: ACTIVE | Noted: 2023-05-11

## 2023-05-11 PROBLEM — D22.4 MELANOCYTIC NEVI OF SCALP AND NECK: Status: ACTIVE | Noted: 2023-05-11

## 2023-05-11 PROCEDURE — 17000 DESTRUCT PREMALG LESION: CPT | Mod: 59

## 2023-05-11 PROCEDURE — 17003 DESTRUCT PREMALG LES 2-14: CPT

## 2023-05-11 PROCEDURE — ? MEDICAL PHOTOGRAPHY REVIEW

## 2023-05-11 PROCEDURE — 99213 OFFICE O/P EST LOW 20 MIN: CPT | Mod: 25

## 2023-05-11 PROCEDURE — ? COUNSELING

## 2023-05-11 PROCEDURE — 11102 TANGNTL BX SKIN SINGLE LES: CPT

## 2023-05-11 PROCEDURE — ? OTHER

## 2023-05-11 PROCEDURE — ? LIQUID NITROGEN

## 2023-05-11 PROCEDURE — ? BIOPSY BY SHAVE METHOD

## 2023-05-11 PROCEDURE — ? FULL BODY SKIN EXAM

## 2023-05-11 ASSESSMENT — LOCATION SIMPLE DESCRIPTION DERM
LOCATION SIMPLE: RIGHT SCALP
LOCATION SIMPLE: LEFT CLAVICULAR SKIN
LOCATION SIMPLE: RIGHT FOREARM
LOCATION SIMPLE: LEFT FOREARM
LOCATION SIMPLE: LEFT OCCIPITAL SCALP
LOCATION SIMPLE: LEFT SCALP
LOCATION SIMPLE: SUPERIOR FOREHEAD
LOCATION SIMPLE: UPPER BACK
LOCATION SIMPLE: SCALP
LOCATION SIMPLE: RIGHT UPPER BACK

## 2023-05-11 ASSESSMENT — LOCATION ZONE DERM
LOCATION ZONE: SCALP
LOCATION ZONE: FACE
LOCATION ZONE: ARM
LOCATION ZONE: TRUNK

## 2023-05-11 ASSESSMENT — LOCATION DETAILED DESCRIPTION DERM
LOCATION DETAILED: RIGHT MEDIAL FRONTAL SCALP
LOCATION DETAILED: SUPERIOR THORACIC SPINE
LOCATION DETAILED: RIGHT PROXIMAL DORSAL FOREARM
LOCATION DETAILED: RIGHT SUPERIOR PARIETAL SCALP
LOCATION DETAILED: RIGHT DISTAL DORSAL FOREARM
LOCATION DETAILED: LEFT SUPERIOR OCCIPITAL SCALP
LOCATION DETAILED: LEFT CENTRAL FRONTAL SCALP
LOCATION DETAILED: INFERIOR THORACIC SPINE
LOCATION DETAILED: RIGHT MEDIAL UPPER BACK
LOCATION DETAILED: LEFT DISTAL RADIAL DORSAL FOREARM
LOCATION DETAILED: LEFT CENTRAL PARIETAL SCALP
LOCATION DETAILED: LEFT CLAVICULAR SKIN
LOCATION DETAILED: LEFT DISTAL DORSAL FOREARM
LOCATION DETAILED: SUPERIOR MID FOREHEAD

## 2023-05-11 NOTE — PROCEDURE: MIPS QUALITY
Detail Level: Detailed
Quality 47: Advance Care Plan: Advance care planning not documented, reason not otherwise specified.
Quality 110: Preventive Care And Screening: Influenza Immunization: Influenza Immunization Administered during Influenza season
Quality 402: Tobacco Use And Help With Quitting Among Adolescents: Patient screened for tobacco and never smoked
Quality 431: Preventive Care And Screening: Unhealthy Alcohol Use - Screening: Patient not identified as an unhealthy alcohol user when screened for unhealthy alcohol use using a systematic screening method
Quality 226: Preventive Care And Screening: Tobacco Use: Screening And Cessation Intervention: Patient screened for tobacco use and is an ex/non-smoker
Quality 130: Documentation Of Current Medications In The Medical Record: Current Medications Documented

## 2023-05-11 NOTE — PROCEDURE: LIQUID NITROGEN
Render Post-Care Instructions In Note?: no
Number Of Freeze-Thaw Cycles: 3 freeze-thaw cycles
Detail Level: Detailed
Show Aperture Variable?: Yes
Post-Care Instructions: I reviewed with the patient in detail post-care instructions. Patient is to wear sunprotection, and avoid picking at any of the treated lesions. Pt may apply Vaseline to crusted or scabbing areas.
Duration Of Freeze Thaw-Cycle (Seconds): 2
Consent: The patient's consent was obtained including but not limited to risks of crusting, scabbing, blistering, scarring, darker or lighter pigmentary change, recurrence, incomplete removal and infection.

## 2023-05-18 ENCOUNTER — NURSE ONLY (OUTPATIENT)
Dept: FAMILY MEDICINE CLINIC | Facility: CLINIC | Age: 83
End: 2023-05-18

## 2023-05-18 DIAGNOSIS — I10 ESSENTIAL (PRIMARY) HYPERTENSION: ICD-10-CM

## 2023-05-18 DIAGNOSIS — E78.00 HYPERCHOLESTEROLEMIA: ICD-10-CM

## 2023-05-18 DIAGNOSIS — E55.9 VITAMIN D DEFICIENCY: ICD-10-CM

## 2023-05-18 DIAGNOSIS — R25.1 TREMOR OF LEFT HAND: ICD-10-CM

## 2023-05-18 DIAGNOSIS — R73.09 OTHER ABNORMAL GLUCOSE: ICD-10-CM

## 2023-05-18 DIAGNOSIS — Z87.442 HISTORY OF NEPHROLITHIASIS: ICD-10-CM

## 2023-05-18 DIAGNOSIS — F41.1 GENERALIZED ANXIETY DISORDER: ICD-10-CM

## 2023-05-18 DIAGNOSIS — K21.9 GASTROESOPHAGEAL REFLUX DISEASE WITHOUT ESOPHAGITIS: ICD-10-CM

## 2023-05-18 DIAGNOSIS — F41.1 GENERALIZED ANXIETY DISORDER: Primary | ICD-10-CM

## 2023-05-18 LAB
25(OH)D3 SERPL-MCNC: 83.6 NG/ML (ref 30–100)
ALBUMIN SERPL-MCNC: 3.7 G/DL (ref 3.2–4.6)
ALBUMIN/GLOB SERPL: 1.2 (ref 0.4–1.6)
ALP SERPL-CCNC: 71 U/L (ref 50–136)
ALT SERPL-CCNC: 21 U/L (ref 12–65)
ANION GAP SERPL CALC-SCNC: 6 MMOL/L (ref 2–11)
AST SERPL-CCNC: 22 U/L (ref 15–37)
BASOPHILS # BLD: 0.1 K/UL (ref 0–0.2)
BASOPHILS NFR BLD: 1 % (ref 0–2)
BILIRUB SERPL-MCNC: 0.8 MG/DL (ref 0.2–1.1)
BUN SERPL-MCNC: 29 MG/DL (ref 8–23)
CALCIUM SERPL-MCNC: 9.3 MG/DL (ref 8.3–10.4)
CHLORIDE SERPL-SCNC: 107 MMOL/L (ref 101–110)
CHOLEST SERPL-MCNC: 105 MG/DL
CO2 SERPL-SCNC: 29 MMOL/L (ref 21–32)
CREAT SERPL-MCNC: 1.4 MG/DL (ref 0.8–1.5)
DIFFERENTIAL METHOD BLD: NORMAL
EOSINOPHIL # BLD: 0.5 K/UL (ref 0–0.8)
EOSINOPHIL NFR BLD: 7 % (ref 0.5–7.8)
ERYTHROCYTE [DISTWIDTH] IN BLOOD BY AUTOMATED COUNT: 13 % (ref 11.9–14.6)
EST. AVERAGE GLUCOSE BLD GHB EST-MCNC: 103 MG/DL
GLOBULIN SER CALC-MCNC: 3.1 G/DL (ref 2.8–4.5)
GLUCOSE SERPL-MCNC: 97 MG/DL (ref 65–100)
HBA1C MFR BLD: 5.2 % (ref 4.8–5.6)
HCT VFR BLD AUTO: 44.3 % (ref 41.1–50.3)
HDLC SERPL-MCNC: 44 MG/DL (ref 40–60)
HDLC SERPL: 2.4
HGB BLD-MCNC: 14.7 G/DL (ref 13.6–17.2)
IMM GRANULOCYTES # BLD AUTO: 0 K/UL (ref 0–0.5)
IMM GRANULOCYTES NFR BLD AUTO: 0 % (ref 0–5)
LDLC SERPL CALC-MCNC: 42 MG/DL
LYMPHOCYTES # BLD: 1.8 K/UL (ref 0.5–4.6)
LYMPHOCYTES NFR BLD: 27 % (ref 13–44)
MAGNESIUM SERPL-MCNC: 2.2 MG/DL (ref 1.8–2.4)
MCH RBC QN AUTO: 32.5 PG (ref 26.1–32.9)
MCHC RBC AUTO-ENTMCNC: 33.2 G/DL (ref 31.4–35)
MCV RBC AUTO: 97.8 FL (ref 82–102)
MONOCYTES # BLD: 0.6 K/UL (ref 0.1–1.3)
MONOCYTES NFR BLD: 9 % (ref 4–12)
NEUTS SEG # BLD: 3.9 K/UL (ref 1.7–8.2)
NEUTS SEG NFR BLD: 56 % (ref 43–78)
NRBC # BLD: 0 K/UL (ref 0–0.2)
PLATELET # BLD AUTO: 267 K/UL (ref 150–450)
PMV BLD AUTO: 9.7 FL (ref 9.4–12.3)
POTASSIUM SERPL-SCNC: 4.5 MMOL/L (ref 3.5–5.1)
PROT SERPL-MCNC: 6.8 G/DL (ref 6.3–8.2)
RBC # BLD AUTO: 4.53 M/UL (ref 4.23–5.6)
SODIUM SERPL-SCNC: 142 MMOL/L (ref 133–143)
TRIGL SERPL-MCNC: 95 MG/DL (ref 35–150)
TSH W FREE THYROID IF ABNORMAL: 0.98 UIU/ML (ref 0.36–3.74)
URATE SERPL-MCNC: 5.3 MG/DL (ref 2.6–6)
VLDLC SERPL CALC-MCNC: 19 MG/DL (ref 6–23)
WBC # BLD AUTO: 6.8 K/UL (ref 4.3–11.1)

## 2023-05-22 SDOH — HEALTH STABILITY: PHYSICAL HEALTH: ON AVERAGE, HOW MANY DAYS PER WEEK DO YOU ENGAGE IN MODERATE TO STRENUOUS EXERCISE (LIKE A BRISK WALK)?: 0 DAYS

## 2023-05-22 ASSESSMENT — LIFESTYLE VARIABLES
HOW OFTEN DO YOU HAVE A DRINK CONTAINING ALCOHOL: 1
HOW OFTEN DO YOU HAVE A DRINK CONTAINING ALCOHOL: NEVER
HOW OFTEN DO YOU HAVE SIX OR MORE DRINKS ON ONE OCCASION: 1

## 2023-05-22 ASSESSMENT — PATIENT HEALTH QUESTIONNAIRE - PHQ9
2. FEELING DOWN, DEPRESSED OR HOPELESS: 0
SUM OF ALL RESPONSES TO PHQ QUESTIONS 1-9: 0
1. LITTLE INTEREST OR PLEASURE IN DOING THINGS: 0
SUM OF ALL RESPONSES TO PHQ QUESTIONS 1-9: 0
SUM OF ALL RESPONSES TO PHQ9 QUESTIONS 1 & 2: 0
SUM OF ALL RESPONSES TO PHQ QUESTIONS 1-9: 0
SUM OF ALL RESPONSES TO PHQ QUESTIONS 1-9: 0

## 2023-05-25 ENCOUNTER — OFFICE VISIT (OUTPATIENT)
Dept: FAMILY MEDICINE CLINIC | Facility: CLINIC | Age: 83
End: 2023-05-25
Payer: MEDICARE

## 2023-05-25 VITALS
HEART RATE: 58 BPM | DIASTOLIC BLOOD PRESSURE: 66 MMHG | SYSTOLIC BLOOD PRESSURE: 120 MMHG | OXYGEN SATURATION: 98 % | BODY MASS INDEX: 27.89 KG/M2 | HEIGHT: 70 IN | WEIGHT: 194.8 LBS

## 2023-05-25 DIAGNOSIS — E55.9 VITAMIN D DEFICIENCY: ICD-10-CM

## 2023-05-25 DIAGNOSIS — I10 ESSENTIAL (PRIMARY) HYPERTENSION: ICD-10-CM

## 2023-05-25 DIAGNOSIS — Z00.00 ENCOUNTER FOR ANNUAL WELLNESS VISIT (AWV) IN MEDICARE PATIENT: Primary | ICD-10-CM

## 2023-05-25 DIAGNOSIS — F41.1 GENERALIZED ANXIETY DISORDER: ICD-10-CM

## 2023-05-25 DIAGNOSIS — R73.09 OTHER ABNORMAL GLUCOSE: ICD-10-CM

## 2023-05-25 DIAGNOSIS — N18.30 STAGE 3 CHRONIC KIDNEY DISEASE, UNSPECIFIED WHETHER STAGE 3A OR 3B CKD (HCC): ICD-10-CM

## 2023-05-25 DIAGNOSIS — E78.00 HYPERCHOLESTEROLEMIA: ICD-10-CM

## 2023-05-25 PROCEDURE — G0439 PPPS, SUBSEQ VISIT: HCPCS | Performed by: NURSE PRACTITIONER

## 2023-05-25 PROCEDURE — 3078F DIAST BP <80 MM HG: CPT | Performed by: NURSE PRACTITIONER

## 2023-05-25 PROCEDURE — 1123F ACP DISCUSS/DSCN MKR DOCD: CPT | Performed by: NURSE PRACTITIONER

## 2023-05-25 PROCEDURE — 3074F SYST BP LT 130 MM HG: CPT | Performed by: NURSE PRACTITIONER

## 2023-05-25 RX ORDER — SENNA AND DOCUSATE SODIUM 50; 8.6 MG/1; MG/1
2 TABLET, FILM COATED ORAL NIGHTLY
Qty: 180 TABLET | Refills: 3 | Status: SHIPPED | OUTPATIENT
Start: 2023-05-25

## 2023-05-25 RX ORDER — LORAZEPAM 1 MG/1
TABLET ORAL
Qty: 30 TABLET | Refills: 5 | Status: SHIPPED | OUTPATIENT
Start: 2023-05-25 | End: 2023-11-21

## 2023-05-25 RX ORDER — LORAZEPAM 1 MG/1
TABLET ORAL EVERY 6 HOURS PRN
COMMUNITY
End: 2023-05-25 | Stop reason: SDUPTHER

## 2023-05-25 SDOH — ECONOMIC STABILITY: FOOD INSECURITY: WITHIN THE PAST 12 MONTHS, YOU WORRIED THAT YOUR FOOD WOULD RUN OUT BEFORE YOU GOT MONEY TO BUY MORE.: NEVER TRUE

## 2023-05-25 SDOH — ECONOMIC STABILITY: FOOD INSECURITY: WITHIN THE PAST 12 MONTHS, THE FOOD YOU BOUGHT JUST DIDN'T LAST AND YOU DIDN'T HAVE MONEY TO GET MORE.: NEVER TRUE

## 2023-05-25 SDOH — ECONOMIC STABILITY: INCOME INSECURITY: HOW HARD IS IT FOR YOU TO PAY FOR THE VERY BASICS LIKE FOOD, HOUSING, MEDICAL CARE, AND HEATING?: NOT HARD AT ALL

## 2023-05-25 ASSESSMENT — ENCOUNTER SYMPTOMS
SHORTNESS OF BREATH: 0
TROUBLE SWALLOWING: 0
RHINORRHEA: 0
FACIAL SWELLING: 0
COLOR CHANGE: 0
ANAL BLEEDING: 0
RECTAL PAIN: 0
EYES NEGATIVE: 1
VOMITING: 0
ALLERGIC/IMMUNOLOGIC NEGATIVE: 1
COUGH: 0
SINUS PAIN: 0
SORE THROAT: 0
APNEA: 0
CHEST TIGHTNESS: 0
EYE PAIN: 0
VOICE CHANGE: 0
SINUS PRESSURE: 0
STRIDOR: 0
PHOTOPHOBIA: 0
WHEEZING: 0
ABDOMINAL DISTENTION: 0
BACK PAIN: 0
CHOKING: 0
EYE DISCHARGE: 0
CONSTIPATION: 1
EYE ITCHING: 0
EYE REDNESS: 0
BLOOD IN STOOL: 0
ABDOMINAL PAIN: 0
NAUSEA: 0
DIARRHEA: 0

## 2023-05-26 NOTE — PATIENT INSTRUCTIONS

## 2023-05-26 NOTE — PROGRESS NOTES
PROGRESS NOTE    Chief Complaint   Patient presents with    Medicare AWV       SUBJECTIVE:     Raj Jeter is a very pleasant 81 y.o. male with hx of hypertension, hyperlipidemia, anxiety, bladder cancer and skin cancer, seen today in office accompanied by his spouse for lab results review and med refill. He is due for his AWV. 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 He had lesion removed but is scheduled to return for Mohs surgery on 6/26. He also is currently following ophthalmology and was recommended to have blepharoplasty. However, he is considering to wait on surgery until his melanoma surgery is completed. He reports doing well otherwise. He does have intermittent constipation. Patient reports no chest pain, no shortness of breath, no unilateral or focal weakness, no orthopnea or PND. Past Medical History, Past Surgical History, Family history, Social History, and Medications were all reviewed with the patient today and updated as necessary.        Current Outpatient Medications   Medication Sig Dispense Refill    LORazepam (ATIVAN) 1 MG tablet Take 1/2 to 1 tablet orally daily as needed for anxiety/sleep 30 tablet 5    sennosides-docusate sodium (SENOKOT-S) 8.6-50 MG tablet Take 2 tablets by mouth at bedtime For constipation 180 tablet 3    atorvastatin (LIPITOR) 40 MG tablet TAKE ONE TABLET BY MOUTH ONE TIME DAILY 90 tablet 3    b complex vitamins capsule Take 1 capsule by mouth daily      acetaminophen (TYLENOL) 500 MG tablet Take 1 tablet by mouth every 6 hours as needed for Pain      metoprolol succinate (TOPROL XL) 50 MG extended release tablet Take 1 tablet by mouth daily 90 tablet 3    azelastine (ASTELIN) 0.1 % nasal spray 1 spray by Nasal route 2 times daily 60 mL 11    DULoxetine (CYMBALTA) 60 MG extended release capsule Take 1 capsule by mouth daily 90 capsule 3    finasteride (PROSCAR) 5 MG tablet Take 1 tablet by mouth daily 90 tablet 3    aspirin 81 MG EC

## 2023-09-06 ENCOUNTER — PATIENT MESSAGE (OUTPATIENT)
Dept: FAMILY MEDICINE CLINIC | Facility: CLINIC | Age: 83
End: 2023-09-06

## 2023-09-06 DIAGNOSIS — R25.1 TREMOR OF LEFT HAND: Primary | ICD-10-CM

## 2023-09-07 NOTE — TELEPHONE ENCOUNTER
From: Araceli Pearl  To: Eduarda Benítez  Sent: 9/6/2023 3:11 PM EDT  Subject: Tremor on left side    The tremor on my left side seems to be getting worse, and I would like a reference to a neurologist in the [facility] area.  Thank you

## 2023-09-15 ENCOUNTER — OFFICE VISIT (OUTPATIENT)
Age: 83
End: 2023-09-15
Payer: MEDICARE

## 2023-09-15 VITALS
HEIGHT: 70 IN | BODY MASS INDEX: 27.6 KG/M2 | DIASTOLIC BLOOD PRESSURE: 64 MMHG | WEIGHT: 192.8 LBS | SYSTOLIC BLOOD PRESSURE: 112 MMHG | HEART RATE: 68 BPM

## 2023-09-15 DIAGNOSIS — I49.3 PVC (PREMATURE VENTRICULAR CONTRACTION): Primary | ICD-10-CM

## 2023-09-15 DIAGNOSIS — I10 ESSENTIAL HYPERTENSION: ICD-10-CM

## 2023-09-15 PROCEDURE — 3074F SYST BP LT 130 MM HG: CPT | Performed by: INTERNAL MEDICINE

## 2023-09-15 PROCEDURE — 1123F ACP DISCUSS/DSCN MKR DOCD: CPT | Performed by: INTERNAL MEDICINE

## 2023-09-15 PROCEDURE — G8427 DOCREV CUR MEDS BY ELIG CLIN: HCPCS | Performed by: INTERNAL MEDICINE

## 2023-09-15 PROCEDURE — 99213 OFFICE O/P EST LOW 20 MIN: CPT | Performed by: INTERNAL MEDICINE

## 2023-09-15 PROCEDURE — 3078F DIAST BP <80 MM HG: CPT | Performed by: INTERNAL MEDICINE

## 2023-09-15 PROCEDURE — 1036F TOBACCO NON-USER: CPT | Performed by: INTERNAL MEDICINE

## 2023-09-15 PROCEDURE — G8417 CALC BMI ABV UP PARAM F/U: HCPCS | Performed by: INTERNAL MEDICINE

## 2023-09-15 ASSESSMENT — ENCOUNTER SYMPTOMS
HEMATOCHEZIA: 0
ABDOMINAL PAIN: 0
SPUTUM PRODUCTION: 0
BLURRED VISION: 0
BOWEL INCONTINENCE: 0
HEMATEMESIS: 0
WHEEZING: 0
COLOR CHANGE: 0
DIARRHEA: 0
HOARSE VOICE: 0
SHORTNESS OF BREATH: 0
ORTHOPNEA: 0

## 2023-09-15 NOTE — PROGRESS NOTES
tablet, Take 1 tablet by mouth daily, Disp: 90 tablet, Rfl: 3    aspirin 81 MG EC tablet, Aspir-81 mg tablet,delayed release  Take 1 tablet every day by oral route., Disp: , Rfl:     Multiple Vitamins-Minerals (MULTIVITAMIN ADULTS 50+ PO), Multivitamin 50 Plus  ONCE DAILY, Disp: , Rfl:     VITAMIN D PO, Vitamin D  1 QD, Disp: , Rfl:     ZINC PO, zinc  1 QD, Disp: , Rfl:     ibuprofen (ADVIL;MOTRIN) 600 MG tablet, Take 1 tablet by mouth 3 times daily, Disp: , Rfl:     fluorouracil (EFUDEX) 5 % cream, APPLY TO SCALP TWO TIMES A DAY FOR 2-4 WEEKS THIS  AND WINTER (Patient not taking: Reported on 3/8/2023), Disp: , Rfl:   No Known Allergies  Past Medical History:   Diagnosis Date    Cancer ([address])     Bladder cancer    Chronic back pain     Hyperlipidemia     Hypertension     PSVT (paroxysmal supraventricular tachycardia) ([address]) 2023     Past Surgical History:   Procedure Laterality Date    BACK SURGERY      Back surgery x 3    BLADDER REPAIR       Family History   Problem Relation Age of Onset    Cancer Mother         bone    Alzheimer's Disease Father     Cancer Sister         Pancreatic    Alzheimer's Disease Paternal Grandmother     Heart Disease Paternal Grandfather       Social History     Tobacco Use    Smoking status: Former     Types: Cigarettes     Quit date: 1965     Years since quittin.7    Smokeless tobacco: Never   Substance Use Topics    Alcohol use: Not Currently       ROS:    Review of Systems   Constitutional: Negative for chills, decreased appetite, diaphoresis, fever and malaise/fatigue. HENT:  Negative for congestion, hearing loss, hoarse voice and nosebleeds. Eyes:  Negative for blurred vision. Cardiovascular:  Negative for chest pain, claudication, cyanosis, dyspnea on exertion, irregular heartbeat, leg swelling, near-syncope, orthopnea, palpitations, paroxysmal nocturnal dyspnea and syncope. Respiratory:  Negative for shortness of breath, sputum production and wheezing.

## 2023-09-26 RX ORDER — FINASTERIDE 5 MG/1
5 TABLET, FILM COATED ORAL DAILY
Qty: 90 TABLET | Refills: 3 | Status: SHIPPED | OUTPATIENT
Start: 2023-09-26

## 2023-11-07 ENCOUNTER — APPOINTMENT (RX ONLY)
Dept: URBAN - METROPOLITAN AREA CLINIC 330 | Facility: CLINIC | Age: 83
Setting detail: DERMATOLOGY
End: 2023-11-07

## 2023-11-07 DIAGNOSIS — Z85.828 PERSONAL HISTORY OF OTHER MALIGNANT NEOPLASM OF SKIN: ICD-10-CM

## 2023-11-07 DIAGNOSIS — Z85.820 PERSONAL HISTORY OF MALIGNANT MELANOMA OF SKIN: ICD-10-CM

## 2023-11-07 DIAGNOSIS — L82.1 OTHER SEBORRHEIC KERATOSIS: ICD-10-CM

## 2023-11-07 DIAGNOSIS — L57.0 ACTINIC KERATOSIS: ICD-10-CM | Status: INADEQUATELY CONTROLLED

## 2023-11-07 DIAGNOSIS — D22 MELANOCYTIC NEVI: ICD-10-CM | Status: STABLE

## 2023-11-07 PROBLEM — D22.5 MELANOCYTIC NEVI OF TRUNK: Status: ACTIVE | Noted: 2023-11-07

## 2023-11-07 PROCEDURE — ? OTHER

## 2023-11-07 PROCEDURE — ? MDM - TREATMENT GOALS

## 2023-11-07 PROCEDURE — ? COUNSELING

## 2023-11-07 PROCEDURE — ? PRESCRIPTION

## 2023-11-07 PROCEDURE — 99214 OFFICE O/P EST MOD 30 MIN: CPT

## 2023-11-07 PROCEDURE — ? FULL BODY SKIN EXAM

## 2023-11-07 PROCEDURE — ? BODY PHOTOGRAPHY

## 2023-11-07 PROCEDURE — ? MEDICAL PHOTOGRAPHY REVIEW

## 2023-11-07 RX ORDER — FLUOROURACIL 5 MG/G
CREAM TOPICAL
Qty: 40 | Refills: 0 | Status: ERX

## 2023-11-07 ASSESSMENT — LOCATION SIMPLE DESCRIPTION DERM
LOCATION SIMPLE: LEFT SCALP
LOCATION SIMPLE: RIGHT ELBOW
LOCATION SIMPLE: RIGHT UPPER BACK
LOCATION SIMPLE: LEFT CLAVICULAR SKIN
LOCATION SIMPLE: UPPER BACK
LOCATION SIMPLE: POSTERIOR SCALP

## 2023-11-07 ASSESSMENT — LOCATION DETAILED DESCRIPTION DERM
LOCATION DETAILED: RIGHT ELBOW
LOCATION DETAILED: INFERIOR THORACIC SPINE
LOCATION DETAILED: LEFT CENTRAL FRONTAL SCALP
LOCATION DETAILED: LEFT CLAVICULAR SKIN
LOCATION DETAILED: RIGHT MEDIAL UPPER BACK
LOCATION DETAILED: POSTERIOR MID-PARIETAL SCALP
LOCATION DETAILED: SUPERIOR THORACIC SPINE

## 2023-11-07 ASSESSMENT — LOCATION ZONE DERM
LOCATION ZONE: TRUNK
LOCATION ZONE: ARM
LOCATION ZONE: SCALP

## 2023-11-07 NOTE — PROCEDURE: BODY PHOTOGRAPHY
English
Whole Body Statement: The whole body was photographed today.
Number Of Photographs (Optional- Will Not Render If 0): 1
Was The Entire Body Photographed (Cannot Bill Unless Entire Body Photographed)?: Please Select Yes or No - If you select Yes you are confirming that you took full body photographs
Detail Level: Detailed
Reason For Photography: The patient is obtaining whole body photography to observe existing suspicious moles and or monitor for the appearance of any new lesions.
Consent was obtained for whole body photography. Patient understands that photograph costs may not be covered by insurance.

## 2023-11-07 NOTE — PROCEDURE: OTHER
Render Risk Assessment In Note?: yes
Note Text (......Xxx Chief Complaint.): This diagnosis correlates with the
Other (Free Text): Patient consent was obtained to proceed with the visit and recommended plan of care after discussion of all risks and benefits, including the risks of COVID-19 exposure.
Detail Level: Generalized
Detail Level: Simple

## 2023-11-16 ENCOUNTER — NURSE ONLY (OUTPATIENT)
Dept: FAMILY MEDICINE CLINIC | Facility: CLINIC | Age: 83
End: 2023-11-16
Payer: MEDICARE

## 2023-11-16 DIAGNOSIS — E55.9 VITAMIN D DEFICIENCY: ICD-10-CM

## 2023-11-16 DIAGNOSIS — R73.09 OTHER ABNORMAL GLUCOSE: ICD-10-CM

## 2023-11-16 DIAGNOSIS — E78.00 HYPERCHOLESTEROLEMIA: ICD-10-CM

## 2023-11-16 DIAGNOSIS — Z12.5 SPECIAL SCREENING FOR MALIGNANT NEOPLASM OF PROSTATE: Primary | ICD-10-CM

## 2023-11-16 DIAGNOSIS — N18.30 STAGE 3 CHRONIC KIDNEY DISEASE, UNSPECIFIED WHETHER STAGE 3A OR 3B CKD (HCC): ICD-10-CM

## 2023-11-16 DIAGNOSIS — I10 ESSENTIAL (PRIMARY) HYPERTENSION: ICD-10-CM

## 2023-11-16 LAB
25(OH)D3 SERPL-MCNC: 87.4 NG/ML (ref 30–100)
ALBUMIN SERPL-MCNC: 4.1 G/DL (ref 3.2–4.6)
ALBUMIN/GLOB SERPL: 1.2 (ref 0.4–1.6)
ALP SERPL-CCNC: 99 U/L (ref 50–136)
ALT SERPL-CCNC: 28 U/L (ref 12–65)
ANION GAP SERPL CALC-SCNC: 6 MMOL/L (ref 2–11)
AST SERPL-CCNC: 18 U/L (ref 15–37)
BASOPHILS # BLD: 0.1 K/UL (ref 0–0.2)
BASOPHILS NFR BLD: 1 % (ref 0–2)
BILIRUB SERPL-MCNC: 0.6 MG/DL (ref 0.2–1.1)
BILIRUBIN, URINE, POC: NEGATIVE
BLOOD URINE, POC: NEGATIVE
BUN SERPL-MCNC: 43 MG/DL (ref 8–23)
CALCIUM SERPL-MCNC: 9.9 MG/DL (ref 8.3–10.4)
CHLORIDE SERPL-SCNC: 106 MMOL/L (ref 101–110)
CHOLEST SERPL-MCNC: 139 MG/DL
CO2 SERPL-SCNC: 30 MMOL/L (ref 21–32)
CREAT SERPL-MCNC: 1.5 MG/DL (ref 0.8–1.5)
DIFFERENTIAL METHOD BLD: NORMAL
EOSINOPHIL # BLD: 0.3 K/UL (ref 0–0.8)
EOSINOPHIL NFR BLD: 4 % (ref 0.5–7.8)
ERYTHROCYTE [DISTWIDTH] IN BLOOD BY AUTOMATED COUNT: 12.7 % (ref 11.9–14.6)
GLOBULIN SER CALC-MCNC: 3.4 G/DL (ref 2.8–4.5)
GLUCOSE SERPL-MCNC: 104 MG/DL (ref 65–100)
GLUCOSE URINE, POC: NEGATIVE
HCT VFR BLD AUTO: 49.1 % (ref 41.1–50.3)
HDLC SERPL-MCNC: 51 MG/DL (ref 40–60)
HDLC SERPL: 2.7
HGB BLD-MCNC: 16.1 G/DL (ref 13.6–17.2)
IMM GRANULOCYTES # BLD AUTO: 0 K/UL (ref 0–0.5)
IMM GRANULOCYTES NFR BLD AUTO: 0 % (ref 0–5)
KETONES, URINE, POC: NEGATIVE
LDLC SERPL CALC-MCNC: 70.4 MG/DL
LEUKOCYTE ESTERASE, URINE, POC: NEGATIVE
LYMPHOCYTES # BLD: 2.5 K/UL (ref 0.5–4.6)
LYMPHOCYTES NFR BLD: 28 % (ref 13–44)
MCH RBC QN AUTO: 32.3 PG (ref 26.1–32.9)
MCHC RBC AUTO-ENTMCNC: 32.8 G/DL (ref 31.4–35)
MCV RBC AUTO: 98.6 FL (ref 82–102)
MONOCYTES # BLD: 0.6 K/UL (ref 0.1–1.3)
MONOCYTES NFR BLD: 7 % (ref 4–12)
NEUTS SEG # BLD: 5.5 K/UL (ref 1.7–8.2)
NEUTS SEG NFR BLD: 60 % (ref 43–78)
NITRITE, URINE, POC: NEGATIVE
NRBC # BLD: 0 K/UL (ref 0–0.2)
PH, URINE, POC: 5.5 (ref 4.6–8)
PLATELET # BLD AUTO: 296 K/UL (ref 150–450)
PMV BLD AUTO: 10 FL (ref 9.4–12.3)
POTASSIUM SERPL-SCNC: 4.2 MMOL/L (ref 3.5–5.1)
PROT SERPL-MCNC: 7.5 G/DL (ref 6.3–8.2)
PROTEIN,URINE, POC: NEGATIVE
PSA SERPL-MCNC: 2.5 NG/ML
RBC # BLD AUTO: 4.98 M/UL (ref 4.23–5.6)
SODIUM SERPL-SCNC: 142 MMOL/L (ref 133–143)
SPECIFIC GRAVITY, URINE, POC: 1.02 (ref 1–1.03)
TRIGL SERPL-MCNC: 88 MG/DL (ref 35–150)
TSH W FREE THYROID IF ABNORMAL: 1.16 UIU/ML (ref 0.36–3.74)
URINALYSIS CLARITY, POC: CLEAR
URINALYSIS COLOR, POC: YELLOW
UROBILINOGEN, POC: NORMAL
VLDLC SERPL CALC-MCNC: 17.6 MG/DL (ref 6–23)
WBC # BLD AUTO: 9 K/UL (ref 4.3–11.1)

## 2023-11-16 PROCEDURE — 81003 URINALYSIS AUTO W/O SCOPE: CPT | Performed by: NURSE PRACTITIONER

## 2023-11-17 LAB
EST. AVERAGE GLUCOSE BLD GHB EST-MCNC: 105 MG/DL
HBA1C MFR BLD: 5.3 % (ref 4.8–5.6)

## 2023-11-25 ASSESSMENT — PATIENT HEALTH QUESTIONNAIRE - PHQ9
SUM OF ALL RESPONSES TO PHQ QUESTIONS 1-9: 0
1. LITTLE INTEREST OR PLEASURE IN DOING THINGS: NOT AT ALL
SUM OF ALL RESPONSES TO PHQ9 QUESTIONS 1 & 2: 0
1. LITTLE INTEREST OR PLEASURE IN DOING THINGS: 0
SUM OF ALL RESPONSES TO PHQ QUESTIONS 1-9: 0
2. FEELING DOWN, DEPRESSED OR HOPELESS: 0
2. FEELING DOWN, DEPRESSED OR HOPELESS: NOT AT ALL
SUM OF ALL RESPONSES TO PHQ9 QUESTIONS 1 & 2: 0

## 2023-11-28 ENCOUNTER — HOSPITAL ENCOUNTER (OUTPATIENT)
Dept: GENERAL RADIOLOGY | Age: 83
Discharge: HOME | End: 2023-12-01

## 2023-11-28 ENCOUNTER — OFFICE VISIT (OUTPATIENT)
Dept: FAMILY MEDICINE CLINIC | Facility: CLINIC | Age: 83
End: 2023-11-28
Payer: MEDICARE

## 2023-11-28 VITALS
DIASTOLIC BLOOD PRESSURE: 68 MMHG | HEART RATE: 72 BPM | SYSTOLIC BLOOD PRESSURE: 100 MMHG | OXYGEN SATURATION: 99 % | BODY MASS INDEX: 27.49 KG/M2 | WEIGHT: 192 LBS | HEIGHT: 70 IN

## 2023-11-28 DIAGNOSIS — G89.29 CHRONIC RIGHT-SIDED LOW BACK PAIN WITH RIGHT-SIDED SCIATICA: Primary | ICD-10-CM

## 2023-11-28 DIAGNOSIS — R09.81 NASAL CONGESTION: ICD-10-CM

## 2023-11-28 DIAGNOSIS — M54.41 CHRONIC RIGHT-SIDED LOW BACK PAIN WITH RIGHT-SIDED SCIATICA: Primary | ICD-10-CM

## 2023-11-28 DIAGNOSIS — I10 ESSENTIAL (PRIMARY) HYPERTENSION: ICD-10-CM

## 2023-11-28 DIAGNOSIS — M54.41 CHRONIC RIGHT-SIDED LOW BACK PAIN WITH RIGHT-SIDED SCIATICA: ICD-10-CM

## 2023-11-28 DIAGNOSIS — E78.00 HYPERCHOLESTEROLEMIA: ICD-10-CM

## 2023-11-28 DIAGNOSIS — G89.29 CHRONIC RIGHT-SIDED LOW BACK PAIN WITH RIGHT-SIDED SCIATICA: ICD-10-CM

## 2023-11-28 DIAGNOSIS — J30.89 OTHER ALLERGIC RHINITIS: ICD-10-CM

## 2023-11-28 DIAGNOSIS — F41.1 GENERALIZED ANXIETY DISORDER: ICD-10-CM

## 2023-11-28 DIAGNOSIS — N18.32 STAGE 3B CHRONIC KIDNEY DISEASE (HCC): ICD-10-CM

## 2023-11-28 DIAGNOSIS — R09.82 POST-NASAL DRIP: ICD-10-CM

## 2023-11-28 PROCEDURE — G8484 FLU IMMUNIZE NO ADMIN: HCPCS | Performed by: NURSE PRACTITIONER

## 2023-11-28 PROCEDURE — 99214 OFFICE O/P EST MOD 30 MIN: CPT | Performed by: NURSE PRACTITIONER

## 2023-11-28 PROCEDURE — 1123F ACP DISCUSS/DSCN MKR DOCD: CPT | Performed by: NURSE PRACTITIONER

## 2023-11-28 PROCEDURE — G8417 CALC BMI ABV UP PARAM F/U: HCPCS | Performed by: NURSE PRACTITIONER

## 2023-11-28 PROCEDURE — 3078F DIAST BP <80 MM HG: CPT | Performed by: NURSE PRACTITIONER

## 2023-11-28 PROCEDURE — 3074F SYST BP LT 130 MM HG: CPT | Performed by: NURSE PRACTITIONER

## 2023-11-28 PROCEDURE — G8427 DOCREV CUR MEDS BY ELIG CLIN: HCPCS | Performed by: NURSE PRACTITIONER

## 2023-11-28 PROCEDURE — 1036F TOBACCO NON-USER: CPT | Performed by: NURSE PRACTITIONER

## 2023-11-28 RX ORDER — TIZANIDINE 4 MG/1
TABLET ORAL
Qty: 30 TABLET | Refills: 0 | Status: SHIPPED | OUTPATIENT
Start: 2023-11-28

## 2023-11-28 RX ORDER — AZELASTINE 1 MG/ML
1 SPRAY, METERED NASAL 2 TIMES DAILY
Qty: 60 ML | Refills: 11 | Status: SHIPPED | OUTPATIENT
Start: 2023-11-28

## 2023-11-28 RX ORDER — METOPROLOL SUCCINATE 50 MG/1
50 TABLET, EXTENDED RELEASE ORAL DAILY
Qty: 100 TABLET | Refills: 3 | Status: SHIPPED | OUTPATIENT
Start: 2023-11-28

## 2023-11-28 RX ORDER — DULOXETIN HYDROCHLORIDE 60 MG/1
60 CAPSULE, DELAYED RELEASE ORAL DAILY
Qty: 100 CAPSULE | Refills: 3 | Status: SHIPPED | OUTPATIENT
Start: 2023-11-28

## 2023-11-28 RX ORDER — ATORVASTATIN CALCIUM 40 MG/1
40 TABLET, FILM COATED ORAL DAILY
Qty: 100 TABLET | Refills: 3 | Status: SHIPPED | OUTPATIENT
Start: 2023-11-28

## 2023-11-28 ASSESSMENT — ENCOUNTER SYMPTOMS
SINUS PAIN: 0
EYES NEGATIVE: 1
PHOTOPHOBIA: 0
COUGH: 0
ABDOMINAL DISTENTION: 0
DIARRHEA: 0
RESPIRATORY NEGATIVE: 1
NAUSEA: 0
ALLERGIC/IMMUNOLOGIC NEGATIVE: 1
CHEST TIGHTNESS: 0
FACIAL SWELLING: 0
TROUBLE SWALLOWING: 0
RHINORRHEA: 0
VOICE CHANGE: 0
SHORTNESS OF BREATH: 0
EYE PAIN: 0
EYE REDNESS: 0
EYE DISCHARGE: 0
APNEA: 0
ANAL BLEEDING: 0
BACK PAIN: 1
CONSTIPATION: 0
BLOOD IN STOOL: 0
ABDOMINAL PAIN: 0
GASTROINTESTINAL NEGATIVE: 1
SINUS PRESSURE: 0
CHOKING: 0
VOMITING: 0
SORE THROAT: 0
COLOR CHANGE: 0
WHEEZING: 0
RECTAL PAIN: 0
STRIDOR: 0
EYE ITCHING: 0

## 2023-11-28 NOTE — PROGRESS NOTES
PROGRESS NOTE    Chief Complaint   Patient presents with    Follow-up     On existing conditions    Jaw Pain     Yawned and heard something pop    Lower Back Pain     Nerve pain       SUBJECTIVE:     Pedro Pablo Pham is a very pleasant 81 y.o. male with hx of  hypertension, hyperlipidemia, anxiety, bladder cancer and skin cancer, seen today in office for lab results review and med refills. He is accompanied by his spouse for today's visit. Patient reports a few concerns today including having exacerbation of lower back discomfort that has been exacerbated over a month or so prior. He reports no trauma or injuries or falls but he does have a history of chronic back issues but he has been using heat therapy, stretching exercises as well as an SI brace whenever he does any yard work. He reports having discomfort aggravated with moving, bending. He reports having mainly discomfort to left lower leg radiation usually but now has noticed more to right side. He reports no changes in no incontinence of bladder or bowel function. He also reports having a physical therapist in [place] that he would like to utilize and would like to have a referral today. He has used his physical therapist in the past and has great confidence as well results from previous visits. He also reports having more \"crunching\" and popping with jaw movement and chewing. He reports this started after he yawned and heard a pop. He has no overt discomfort but wanted to have it evaluated. Patient also has had increasing tremors of hands worse on left than right but has been noted to bilateral.  He has been referred to neurology and has an appointment next week for consultation. Past Medical History, Past Surgical History, Family history, Social History, and Medications were all reviewed with the patient today and updated as necessary.        Current Outpatient Medications   Medication Sig Dispense Refill    DULoxetine (CYMBALTA) 60 MG

## 2023-11-30 DIAGNOSIS — F41.1 GENERALIZED ANXIETY DISORDER: Primary | ICD-10-CM

## 2023-11-30 RX ORDER — LORAZEPAM 1 MG/1
1 TABLET ORAL NIGHTLY PRN
Qty: 90 TABLET | Refills: 1 | Status: SHIPPED | OUTPATIENT
Start: 2023-11-30 | End: 2024-05-28

## 2023-12-04 ENCOUNTER — OFFICE VISIT (OUTPATIENT)
Dept: NEUROLOGY | Age: 83
End: 2023-12-04
Payer: MEDICARE

## 2023-12-04 VITALS
OXYGEN SATURATION: 98 % | WEIGHT: 191 LBS | BODY MASS INDEX: 27.41 KG/M2 | HEART RATE: 94 BPM | SYSTOLIC BLOOD PRESSURE: 126 MMHG | DIASTOLIC BLOOD PRESSURE: 76 MMHG

## 2023-12-04 DIAGNOSIS — G25.2 RESTING TREMOR: ICD-10-CM

## 2023-12-04 DIAGNOSIS — R26.9 GAIT DISTURBANCE: ICD-10-CM

## 2023-12-04 DIAGNOSIS — G20.C PRIMARY PARKINSONISM: Primary | ICD-10-CM

## 2023-12-04 DIAGNOSIS — G62.9 PERIPHERAL POLYNEUROPATHY: ICD-10-CM

## 2023-12-04 PROCEDURE — G8484 FLU IMMUNIZE NO ADMIN: HCPCS | Performed by: PSYCHIATRY & NEUROLOGY

## 2023-12-04 PROCEDURE — 1036F TOBACCO NON-USER: CPT | Performed by: PSYCHIATRY & NEUROLOGY

## 2023-12-04 PROCEDURE — G8427 DOCREV CUR MEDS BY ELIG CLIN: HCPCS | Performed by: PSYCHIATRY & NEUROLOGY

## 2023-12-04 PROCEDURE — 3074F SYST BP LT 130 MM HG: CPT | Performed by: PSYCHIATRY & NEUROLOGY

## 2023-12-04 PROCEDURE — 1123F ACP DISCUSS/DSCN MKR DOCD: CPT | Performed by: PSYCHIATRY & NEUROLOGY

## 2023-12-04 PROCEDURE — 99204 OFFICE O/P NEW MOD 45 MIN: CPT | Performed by: PSYCHIATRY & NEUROLOGY

## 2023-12-04 PROCEDURE — 3078F DIAST BP <80 MM HG: CPT | Performed by: PSYCHIATRY & NEUROLOGY

## 2023-12-04 PROCEDURE — G8417 CALC BMI ABV UP PARAM F/U: HCPCS | Performed by: PSYCHIATRY & NEUROLOGY

## 2023-12-04 ASSESSMENT — ENCOUNTER SYMPTOMS
COUGH: 0
CONSTIPATION: 1
TROUBLE SWALLOWING: 1
VOICE CHANGE: 1

## 2023-12-04 ASSESSMENT — PATIENT HEALTH QUESTIONNAIRE - PHQ9
SUM OF ALL RESPONSES TO PHQ QUESTIONS 1-9: 0
SUM OF ALL RESPONSES TO PHQ QUESTIONS 1-9: 0
1. LITTLE INTEREST OR PLEASURE IN DOING THINGS: 0
SUM OF ALL RESPONSES TO PHQ QUESTIONS 1-9: 0
2. FEELING DOWN, DEPRESSED OR HOPELESS: 0
SUM OF ALL RESPONSES TO PHQ QUESTIONS 1-9: 0
SUM OF ALL RESPONSES TO PHQ9 QUESTIONS 1 & 2: 0

## 2023-12-04 NOTE — PROGRESS NOTES
Omari Christianson  [address] [facility] [address], [address]  Phone: [phone] Fax [phone]  Radha Griffiths MD      Patient: Tutu Driscoll  Provider: Radha Griffiths MD    CC:   Chief Complaint   Patient presents with    New Patient    Tremors     Left hand and mouth/jaw tremor. Referring Provider:    History of Present Illness:     Tutu Driscoll is a 81 y.o. RH male who presents for evaluation of tremor. He is accompanied by his spouse. Patient presents for further evaluation of a tremor which is primarily occupied the left hand over the last 6 months. Over the last month he has also noted an intermittent tremor of his mouth/jaw. Tremor appears to have a mixed resting and action component. It is visibly noted at rest intermittently throughout the exam.  But he does also notice when trying to hold objects steady in his nondominant left hand. He may have some slight tremor in his right hand as he does notice some slight difficulty handwriting. He denies any family history of tremor. He does have a chronic gait disturbance which is complicated by chronic low back pain. He has had 3 prior lumbar spine surgeries and still has some residual back pain in addition to sciatica like pain radiating down the left lower extremity. He is still followed by an outside provider and more recently there has been concern for SI joint dysfunction. He notes a feeling of diffuse numbness and paresthesias in the lower extremities and has been told he has \"neuropathy\" for several years. He notes a slight change in speech volume over time and there has been intermittent dysphagia. He does have a problem with chronic constipation which is currently managed with prune juice. Of note, chart review did reveal an ED visit in August for severe constipation with abdominal distention. He denies any significant sleep complaints other than some difficulties at night with his back pain.  He has no

## 2023-12-26 ENCOUNTER — HOSPITAL ENCOUNTER (OUTPATIENT)
Dept: MRI IMAGING | Age: 83
Discharge: HOME | End: 2023-12-29
Attending: PSYCHIATRY & NEUROLOGY

## 2023-12-26 DIAGNOSIS — G25.2 RESTING TREMOR: ICD-10-CM

## 2023-12-26 DIAGNOSIS — G20.C PRIMARY PARKINSONISM: ICD-10-CM

## 2024-02-05 ENCOUNTER — PROCEDURE VISIT (OUTPATIENT)
Dept: NEUROLOGY | Age: 84
End: 2024-02-05
Payer: MEDICARE

## 2024-02-05 VITALS — HEIGHT: 70 IN | HEART RATE: 82 BPM | OXYGEN SATURATION: 97 % | BODY MASS INDEX: 27.41 KG/M2

## 2024-02-05 DIAGNOSIS — M54.50 CHRONIC LOW BACK PAIN, UNSPECIFIED BACK PAIN LATERALITY, UNSPECIFIED WHETHER SCIATICA PRESENT: ICD-10-CM

## 2024-02-05 DIAGNOSIS — R20.0 NUMBNESS IN FEET: Primary | ICD-10-CM

## 2024-02-05 DIAGNOSIS — G89.29 CHRONIC LOW BACK PAIN, UNSPECIFIED BACK PAIN LATERALITY, UNSPECIFIED WHETHER SCIATICA PRESENT: ICD-10-CM

## 2024-02-05 PROCEDURE — 95910 NRV CNDJ TEST 7-8 STUDIES: CPT | Performed by: PSYCHIATRY & NEUROLOGY

## 2024-02-05 PROCEDURE — 95885 MUSC TST DONE W/NERV TST LIM: CPT | Performed by: PSYCHIATRY & NEUROLOGY

## 2024-02-05 NOTE — PROGRESS NOTES
EMG/Nerve Conduction Study Procedure Note  [address]    [address]  [place]  [address]   [phone]      Hx:    Exam:     83 y.o.  M  W male  w back problems and 3 lumbar ops 2/3/4 and 5/S1..  left knee 0/4 reflex / some gait dis.  No babinski. No atrophy.  + tremors.  Patient and wife state that he was diagnosed with neuropathy for many years now.  He did not bring a cane with him at this time.  Ref:   Dr Reddy Galicia  PCP:  Cammie Guidry NP  Tech:: Ambrose Robertson  Height: 5 foot 10 inch        Summary             needle EMG of selected lower extremity muscles as below with CV.       Controlled environmental factors / EMG lab.  Temperature.   NCV : sensory segments:    Abnormal bilateral sural = marked attenuation of the SNAP without any edema or other technical-but with normal velocities.  NCV plantar sensory segments:     Deferred.  NCV Motor MCV segments:     Normal bilateral peroneal bilateral tibial MCV.  F-wave studies:         Normal bilateral peroneal and tibial F waves.  H-REFLEX Studies:    Normal bilateral H-reflexes.   NEEDLE EMG:   Tested muscles::   Bilateral TA MG VL muscles = normal =  Normal insertional activity and interference pattern/recruitment.  No fasciculations fibrillations positive sharp waves.  Normal MUP.  No BSS AP.  No giant MUP.  No myotonia.  No upper motor neuron sign.          INTERPRETATION:     THESE FINDINGS ARE ELECTROPHYSIOLOGIC SUGGESTIVE EVIDENCE ONLY OF A POSSIBLE SENSORY DISTURBANCE OF THE LOWER EXTREMITIES.  NO DENERVATION OR AXONAL DENERVATION IDENTIFIED.   NO SPECIFIC OTHER NEUROPATHIES.  NO MYOPATHY MYOTONIA OR FASCICULATION.            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    Patient made aware.              Please Note::     Data and waveforms * filed under

## 2024-03-20 ENCOUNTER — OFFICE VISIT (OUTPATIENT)
Age: 84
End: 2024-03-20
Payer: MEDICARE

## 2024-03-20 VITALS
SYSTOLIC BLOOD PRESSURE: 114 MMHG | DIASTOLIC BLOOD PRESSURE: 74 MMHG | WEIGHT: 198 LBS | HEART RATE: 68 BPM | BODY MASS INDEX: 28.35 KG/M2 | HEIGHT: 70 IN

## 2024-03-20 DIAGNOSIS — I49.3 PVC (PREMATURE VENTRICULAR CONTRACTION): Primary | ICD-10-CM

## 2024-03-20 DIAGNOSIS — I10 ESSENTIAL HYPERTENSION: ICD-10-CM

## 2024-03-20 PROCEDURE — 3078F DIAST BP <80 MM HG: CPT | Performed by: INTERNAL MEDICINE

## 2024-03-20 PROCEDURE — 1123F ACP DISCUSS/DSCN MKR DOCD: CPT | Performed by: INTERNAL MEDICINE

## 2024-03-20 PROCEDURE — G8417 CALC BMI ABV UP PARAM F/U: HCPCS | Performed by: INTERNAL MEDICINE

## 2024-03-20 PROCEDURE — 99213 OFFICE O/P EST LOW 20 MIN: CPT | Performed by: INTERNAL MEDICINE

## 2024-03-20 PROCEDURE — G8484 FLU IMMUNIZE NO ADMIN: HCPCS | Performed by: INTERNAL MEDICINE

## 2024-03-20 PROCEDURE — 1036F TOBACCO NON-USER: CPT | Performed by: INTERNAL MEDICINE

## 2024-03-20 PROCEDURE — 3074F SYST BP LT 130 MM HG: CPT | Performed by: INTERNAL MEDICINE

## 2024-03-20 PROCEDURE — G8427 DOCREV CUR MEDS BY ELIG CLIN: HCPCS | Performed by: INTERNAL MEDICINE

## 2024-03-20 PROCEDURE — 93000 ELECTROCARDIOGRAM COMPLETE: CPT | Performed by: INTERNAL MEDICINE

## 2024-03-20 ASSESSMENT — ENCOUNTER SYMPTOMS
SPUTUM PRODUCTION: 0
HOARSE VOICE: 0
HEMATOCHEZIA: 0
SHORTNESS OF BREATH: 0
BLURRED VISION: 0
ORTHOPNEA: 0
ABDOMINAL PAIN: 0
DIARRHEA: 0
HEMATEMESIS: 0
WHEEZING: 0

## 2024-03-20 NOTE — PROGRESS NOTES
Rfl: 3    sennosides-docusate sodium (SENOKOT-S) 8.6-50 MG tablet, Take 2 tablets by mouth at bedtime For constipation, Disp: 180 tablet, Rfl: 3    b complex vitamins capsule, Take 1 capsule by mouth daily, Disp: , Rfl:     acetaminophen (TYLENOL) 500 MG tablet, Take 1 tablet by mouth every 6 hours as needed for Pain, Disp: , Rfl:     fluorouracil (EFUDEX) 5 % cream, , Disp: , Rfl:     Multiple Vitamins-Minerals (MULTIVITAMIN ADULTS 50+ PO), Multivitamin 50 Plus  ONCE DAILY, Disp: , Rfl:     VITAMIN D PO, Vitamin D  1 QD, Disp: , Rfl:     ZINC PO, zinc  1 QD, Disp: , Rfl:     ibuprofen (ADVIL;MOTRIN) 600 MG tablet, Take 1 tablet by mouth 3 times daily, Disp: , Rfl:     aspirin 81 MG EC tablet, Aspir-81 mg tablet,delayed release  Take 1 tablet every day by oral route. (Patient not taking: Reported on 2023), Disp: , Rfl:   No Known Allergies  Past Medical History:   Diagnosis Date    Cancer (HCC)     Bladder cancer    Chronic back pain     Hyperlipidemia     Hypertension     PSVT (paroxysmal supraventricular tachycardia) 2023     Past Surgical History:   Procedure Laterality Date    BACK SURGERY      Back surgery x 3    BLADDER REPAIR       Family History   Problem Relation Age of Onset    Cancer Mother         bone    Alzheimer's Disease Father     Cancer Sister         Pancreatic    Alzheimer's Disease Paternal Grandmother     Heart Disease Paternal Grandfather       Social History     Tobacco Use    Smoking status: Former     Current packs/day: 0.00     Types: Cigarettes     Quit date: 1965     Years since quittin.2    Smokeless tobacco: Never   Substance Use Topics    Alcohol use: Not Currently       ROS:    Review of Systems   Constitutional: Negative for chills, decreased appetite, diaphoresis, fever and malaise/fatigue.   HENT:  Negative for congestion, hearing loss, hoarse voice and nosebleeds.    Eyes:  Negative for blurred vision.   Cardiovascular:  Negative for chest pain, claudication,

## 2024-05-03 ENCOUNTER — OFFICE VISIT (OUTPATIENT)
Dept: NEUROLOGY | Age: 84
End: 2024-05-03
Payer: MEDICARE

## 2024-05-03 VITALS
HEART RATE: 81 BPM | DIASTOLIC BLOOD PRESSURE: 71 MMHG | SYSTOLIC BLOOD PRESSURE: 132 MMHG | BODY MASS INDEX: 28.35 KG/M2 | HEIGHT: 70 IN | WEIGHT: 198 LBS

## 2024-05-03 DIAGNOSIS — G62.9 PERIPHERAL POLYNEUROPATHY: ICD-10-CM

## 2024-05-03 DIAGNOSIS — R26.9 GAIT DISTURBANCE: ICD-10-CM

## 2024-05-03 DIAGNOSIS — G20.C PRIMARY PARKINSONISM (HCC): Primary | ICD-10-CM

## 2024-05-03 PROCEDURE — 3075F SYST BP GE 130 - 139MM HG: CPT | Performed by: PSYCHIATRY & NEUROLOGY

## 2024-05-03 PROCEDURE — 99215 OFFICE O/P EST HI 40 MIN: CPT | Performed by: PSYCHIATRY & NEUROLOGY

## 2024-05-03 PROCEDURE — 3078F DIAST BP <80 MM HG: CPT | Performed by: PSYCHIATRY & NEUROLOGY

## 2024-05-03 PROCEDURE — 1036F TOBACCO NON-USER: CPT | Performed by: PSYCHIATRY & NEUROLOGY

## 2024-05-03 PROCEDURE — G8417 CALC BMI ABV UP PARAM F/U: HCPCS | Performed by: PSYCHIATRY & NEUROLOGY

## 2024-05-03 PROCEDURE — 1123F ACP DISCUSS/DSCN MKR DOCD: CPT | Performed by: PSYCHIATRY & NEUROLOGY

## 2024-05-03 PROCEDURE — G8427 DOCREV CUR MEDS BY ELIG CLIN: HCPCS | Performed by: PSYCHIATRY & NEUROLOGY

## 2024-05-03 ASSESSMENT — ENCOUNTER SYMPTOMS
COUGH: 0
TROUBLE SWALLOWING: 1
VOICE CHANGE: 1
CONSTIPATION: 1

## 2024-05-03 NOTE — PROGRESS NOTES
[facility] NEUROLOGY  [address], [address]  [place] [address]  Phone: [phone] Fax [phone]  Reddy Galicia MD      Patient: Sabas Maya  Provider: Reddy Galicia MD    CC:   Chief Complaint   Patient presents with    Follow-up     Parkinson's disease     Referring Provider:    History of Present Illness:     Sabas Maya is a 83 y.o. RH male who presents for follow-up of tremor.     He is accompanied by his spouse.     Patient presents for follow-up and continued management of a tremor which is best described as an asymmetric left-sided resting tremor though does have a mixed action component that can interfere with tasks requiring dexterity.  He has noted some slight tremor of his right hand.  There is no family history of tremor or PD.    He has a persistent gait disturbance further complicated by chronic low back pain.  He has a history of 3 prior lumbar spine surgeries and still unfortunately has residual left-sided low back pain with sciatica pain down the left lower extremity.  This is being followed by outside providers.  He had a recent nerve conduction study of the lower extremities that showed evidence of a sensory neuropathy in the distal lower extremities.    We have reviewed the results of his MRI of the brain which was found to be largely unremarkable.  He had an MRI of the  cervical spine that shows degenerative disc disease with some neuroforaminal narrowing but unlikely to be causing most of his symptoms.    There has been a slight decrease in speech volume and intermittent dysphagia.  He also has a history of chronic constipation currently managed with prune juice and other stool softeners.    He denies any difficulties with insomnia other than some interruption due to his back pain and there is no history of RBD.  There continues to be no significant cognitive disturbances.       Review of Systems:   Review of Systems   Constitutional:  Negative for fever.   HENT:  Positive

## 2024-05-07 ENCOUNTER — APPOINTMENT (RX ONLY)
Dept: URBAN - METROPOLITAN AREA CLINIC 330 | Facility: CLINIC | Age: 84
Setting detail: DERMATOLOGY
End: 2024-05-07

## 2024-05-07 DIAGNOSIS — D22 MELANOCYTIC NEVI: ICD-10-CM

## 2024-05-07 DIAGNOSIS — Z85.828 PERSONAL HISTORY OF OTHER MALIGNANT NEOPLASM OF SKIN: ICD-10-CM

## 2024-05-07 DIAGNOSIS — L57.0 ACTINIC KERATOSIS: ICD-10-CM

## 2024-05-07 DIAGNOSIS — Z85.820 PERSONAL HISTORY OF MALIGNANT MELANOMA OF SKIN: ICD-10-CM

## 2024-05-07 DIAGNOSIS — L82.1 OTHER SEBORRHEIC KERATOSIS: ICD-10-CM

## 2024-05-07 PROBLEM — D22.5 MELANOCYTIC NEVI OF TRUNK: Status: ACTIVE | Noted: 2024-05-07

## 2024-05-07 PROCEDURE — ? FULL BODY SKIN EXAM

## 2024-05-07 PROCEDURE — 99213 OFFICE O/P EST LOW 20 MIN: CPT | Mod: 25

## 2024-05-07 PROCEDURE — 17003 DESTRUCT PREMALG LES 2-14: CPT

## 2024-05-07 PROCEDURE — ? LIQUID NITROGEN

## 2024-05-07 PROCEDURE — ? MEDICAL PHOTOGRAPHY REVIEW

## 2024-05-07 PROCEDURE — ? OTHER

## 2024-05-07 PROCEDURE — 17000 DESTRUCT PREMALG LESION: CPT

## 2024-05-07 PROCEDURE — ? COUNSELING

## 2024-05-07 ASSESSMENT — LOCATION SIMPLE DESCRIPTION DERM
LOCATION SIMPLE: LEFT OCCIPITAL SCALP
LOCATION SIMPLE: SUPERIOR FOREHEAD
LOCATION SIMPLE: LEFT KNEE
LOCATION SIMPLE: SCALP
LOCATION SIMPLE: RIGHT FOREARM
LOCATION SIMPLE: LEFT FOREHEAD
LOCATION SIMPLE: RIGHT EAR
LOCATION SIMPLE: UPPER BACK
LOCATION SIMPLE: LEFT CLAVICULAR SKIN
LOCATION SIMPLE: RIGHT UPPER BACK
LOCATION SIMPLE: LEFT SCALP

## 2024-05-07 ASSESSMENT — LOCATION DETAILED DESCRIPTION DERM
LOCATION DETAILED: RIGHT SUPERIOR HELIX
LOCATION DETAILED: LEFT CLAVICULAR SKIN
LOCATION DETAILED: LEFT SUPERIOR OCCIPITAL SCALP
LOCATION DETAILED: SUPERIOR MID FOREHEAD
LOCATION DETAILED: RIGHT PROXIMAL DORSAL FOREARM
LOCATION DETAILED: RIGHT MEDIAL UPPER BACK
LOCATION DETAILED: INFERIOR THORACIC SPINE
LOCATION DETAILED: LEFT CENTRAL FRONTAL SCALP
LOCATION DETAILED: SUPERIOR THORACIC SPINE
LOCATION DETAILED: LEFT SUPERIOR PARIETAL SCALP
LOCATION DETAILED: LEFT MEDIAL KNEE
LOCATION DETAILED: LEFT SUPERIOR MEDIAL FOREHEAD

## 2024-05-07 ASSESSMENT — LOCATION ZONE DERM
LOCATION ZONE: ARM
LOCATION ZONE: TRUNK
LOCATION ZONE: EAR
LOCATION ZONE: SCALP
LOCATION ZONE: FACE
LOCATION ZONE: LEG

## 2024-05-07 NOTE — PROCEDURE: LIQUID NITROGEN
Consent: The patient's consent was obtained including but not limited to risks of crusting, scabbing, blistering, scarring, darker or lighter pigmentary change, recurrence, incomplete removal and infection.
Detail Level: Detailed
Render Post-Care Instructions In Note?: no
Post-Care Instructions: I reviewed with the patient in detail post-care instructions. Patient is to wear sunprotection, and avoid picking at any of the treated lesions. Pt may apply Vaseline to crusted or scabbing areas.
Show Applicator Variable?: Yes
Number Of Freeze-Thaw Cycles: 3 freeze-thaw cycles
Duration Of Freeze Thaw-Cycle (Seconds): 2

## 2024-05-07 NOTE — PROCEDURE: MIPS QUALITY
Detail Level: Detailed
Quality 47: Advance Care Plan: Advance care planning not documented, reason not otherwise specified.
Quality 130: Documentation Of Current Medications In The Medical Record: Current Medications Documented
Quality 431: Preventive Care And Screening: Unhealthy Alcohol Use - Screening: Patient not identified as an unhealthy alcohol user when screened for unhealthy alcohol use using a systematic screening method
Quality 226: Preventive Care And Screening: Tobacco Use: Screening And Cessation Intervention: Patient screened for tobacco use and is an ex/non-smoker

## 2024-05-20 ENCOUNTER — NURSE ONLY (OUTPATIENT)
Dept: FAMILY MEDICINE CLINIC | Facility: CLINIC | Age: 84
End: 2024-05-20

## 2024-05-20 DIAGNOSIS — I10 ESSENTIAL (PRIMARY) HYPERTENSION: Primary | ICD-10-CM

## 2024-05-20 DIAGNOSIS — R73.09 OTHER ABNORMAL GLUCOSE: ICD-10-CM

## 2024-05-20 DIAGNOSIS — E78.00 HYPERCHOLESTEROLEMIA: ICD-10-CM

## 2024-05-20 DIAGNOSIS — I10 ESSENTIAL (PRIMARY) HYPERTENSION: ICD-10-CM

## 2024-05-20 DIAGNOSIS — K21.9 GASTROESOPHAGEAL REFLUX DISEASE WITHOUT ESOPHAGITIS: ICD-10-CM

## 2024-05-20 DIAGNOSIS — E55.9 VITAMIN D DEFICIENCY: ICD-10-CM

## 2024-05-20 LAB
25(OH)D3 SERPL-MCNC: 89.1 NG/ML (ref 30–100)
ALBUMIN SERPL-MCNC: 3.9 G/DL (ref 3.2–4.6)
ALBUMIN/GLOB SERPL: 1.4 (ref 1–1.9)
ALP SERPL-CCNC: 90 U/L (ref 40–129)
ALT SERPL-CCNC: 21 U/L (ref 12–65)
ANION GAP SERPL CALC-SCNC: 10 MMOL/L (ref 9–18)
AST SERPL-CCNC: 28 U/L (ref 15–37)
BASOPHILS # BLD: 0.1 K/UL (ref 0–0.2)
BASOPHILS NFR BLD: 1 % (ref 0–2)
BILIRUB SERPL-MCNC: 0.6 MG/DL (ref 0–1.2)
BUN SERPL-MCNC: 21 MG/DL (ref 8–23)
CALCIUM SERPL-MCNC: 9.9 MG/DL (ref 8.8–10.2)
CHLORIDE SERPL-SCNC: 106 MMOL/L (ref 98–107)
CHOLEST SERPL-MCNC: 133 MG/DL (ref 0–200)
CO2 SERPL-SCNC: 29 MMOL/L (ref 20–28)
CREAT SERPL-MCNC: 1.35 MG/DL (ref 0.8–1.3)
DIFFERENTIAL METHOD BLD: NORMAL
EOSINOPHIL # BLD: 0.4 K/UL (ref 0–0.8)
EOSINOPHIL NFR BLD: 4 % (ref 0.5–7.8)
ERYTHROCYTE [DISTWIDTH] IN BLOOD BY AUTOMATED COUNT: 12.7 % (ref 11.9–14.6)
EST. AVERAGE GLUCOSE BLD GHB EST-MCNC: 119 MG/DL
GLOBULIN SER CALC-MCNC: 2.9 G/DL (ref 2.3–3.5)
GLUCOSE SERPL-MCNC: 107 MG/DL (ref 70–99)
HBA1C MFR BLD: 5.8 % (ref 0–5.6)
HCT VFR BLD AUTO: 46.3 % (ref 41.1–50.3)
HDLC SERPL-MCNC: 41 MG/DL (ref 40–60)
HDLC SERPL: 3.3 (ref 0–5)
HGB BLD-MCNC: 15.1 G/DL (ref 13.6–17.2)
IMM GRANULOCYTES # BLD AUTO: 0 K/UL (ref 0–0.5)
IMM GRANULOCYTES NFR BLD AUTO: 0 % (ref 0–5)
LDLC SERPL CALC-MCNC: 59 MG/DL (ref 0–100)
LYMPHOCYTES # BLD: 2.2 K/UL (ref 0.5–4.6)
LYMPHOCYTES NFR BLD: 24 % (ref 13–44)
MCH RBC QN AUTO: 32.3 PG (ref 26.1–32.9)
MCHC RBC AUTO-ENTMCNC: 32.6 G/DL (ref 31.4–35)
MCV RBC AUTO: 98.9 FL (ref 82–102)
MONOCYTES # BLD: 0.7 K/UL (ref 0.1–1.3)
MONOCYTES NFR BLD: 7 % (ref 4–12)
NEUTS SEG # BLD: 6 K/UL (ref 1.7–8.2)
NEUTS SEG NFR BLD: 64 % (ref 43–78)
NRBC # BLD: 0 K/UL (ref 0–0.2)
PLATELET # BLD AUTO: 276 K/UL (ref 150–450)
PMV BLD AUTO: 9.8 FL (ref 9.4–12.3)
POTASSIUM SERPL-SCNC: 4.5 MMOL/L (ref 3.5–5.1)
PROT SERPL-MCNC: 6.9 G/DL (ref 6.3–8.2)
RBC # BLD AUTO: 4.68 M/UL (ref 4.23–5.6)
SODIUM SERPL-SCNC: 144 MMOL/L (ref 136–145)
TRIGL SERPL-MCNC: 167 MG/DL (ref 0–150)
VLDLC SERPL CALC-MCNC: 33 MG/DL (ref 6–23)
WBC # BLD AUTO: 9.4 K/UL (ref 4.3–11.1)

## 2024-05-26 SDOH — ECONOMIC STABILITY: INCOME INSECURITY: HOW HARD IS IT FOR YOU TO PAY FOR THE VERY BASICS LIKE FOOD, HOUSING, MEDICAL CARE, AND HEATING?: NOT HARD AT ALL

## 2024-05-26 SDOH — ECONOMIC STABILITY: FOOD INSECURITY: WITHIN THE PAST 12 MONTHS, YOU WORRIED THAT YOUR FOOD WOULD RUN OUT BEFORE YOU GOT MONEY TO BUY MORE.: NEVER TRUE

## 2024-05-26 SDOH — ECONOMIC STABILITY: FOOD INSECURITY: WITHIN THE PAST 12 MONTHS, THE FOOD YOU BOUGHT JUST DIDN'T LAST AND YOU DIDN'T HAVE MONEY TO GET MORE.: NEVER TRUE

## 2024-05-26 SDOH — HEALTH STABILITY: PHYSICAL HEALTH: ON AVERAGE, HOW MANY MINUTES DO YOU ENGAGE IN EXERCISE AT THIS LEVEL?: 40 MIN

## 2024-05-26 SDOH — HEALTH STABILITY: PHYSICAL HEALTH: ON AVERAGE, HOW MANY DAYS PER WEEK DO YOU ENGAGE IN MODERATE TO STRENUOUS EXERCISE (LIKE A BRISK WALK)?: 6 DAYS

## 2024-05-26 ASSESSMENT — LIFESTYLE VARIABLES
HOW OFTEN DO YOU HAVE SIX OR MORE DRINKS ON ONE OCCASION: 1
HOW OFTEN DO YOU HAVE A DRINK CONTAINING ALCOHOL: NEVER
HOW MANY STANDARD DRINKS CONTAINING ALCOHOL DO YOU HAVE ON A TYPICAL DAY: 0
HOW MANY STANDARD DRINKS CONTAINING ALCOHOL DO YOU HAVE ON A TYPICAL DAY: PATIENT DOES NOT DRINK
HOW OFTEN DO YOU HAVE A DRINK CONTAINING ALCOHOL: 1

## 2024-05-26 ASSESSMENT — PATIENT HEALTH QUESTIONNAIRE - PHQ9
SUM OF ALL RESPONSES TO PHQ QUESTIONS 1-9: 0
2. FEELING DOWN, DEPRESSED OR HOPELESS: NOT AT ALL
1. LITTLE INTEREST OR PLEASURE IN DOING THINGS: NOT AT ALL
SUM OF ALL RESPONSES TO PHQ9 QUESTIONS 1 & 2: 0
SUM OF ALL RESPONSES TO PHQ QUESTIONS 1-9: 0

## 2024-05-29 ENCOUNTER — TELEMEDICINE (OUTPATIENT)
Dept: FAMILY MEDICINE CLINIC | Facility: CLINIC | Age: 84
End: 2024-05-29
Payer: MEDICARE

## 2024-05-29 DIAGNOSIS — G20.A1 PARKINSON'S DISEASE, UNSPECIFIED WHETHER DYSKINESIA PRESENT, UNSPECIFIED WHETHER MANIFESTATIONS FLUCTUATE (HCC): ICD-10-CM

## 2024-05-29 DIAGNOSIS — N18.32 STAGE 3B CHRONIC KIDNEY DISEASE (HCC): ICD-10-CM

## 2024-05-29 DIAGNOSIS — I10 ESSENTIAL (PRIMARY) HYPERTENSION: ICD-10-CM

## 2024-05-29 DIAGNOSIS — G89.29 CHRONIC RIGHT-SIDED LOW BACK PAIN WITH RIGHT-SIDED SCIATICA: ICD-10-CM

## 2024-05-29 DIAGNOSIS — Z00.00 ENCOUNTER FOR ANNUAL WELLNESS VISIT (AWV) IN MEDICARE PATIENT: Primary | ICD-10-CM

## 2024-05-29 DIAGNOSIS — E55.9 VITAMIN D DEFICIENCY: ICD-10-CM

## 2024-05-29 DIAGNOSIS — E78.00 HYPERCHOLESTEROLEMIA: ICD-10-CM

## 2024-05-29 DIAGNOSIS — R73.09 OTHER ABNORMAL GLUCOSE: ICD-10-CM

## 2024-05-29 DIAGNOSIS — M54.41 CHRONIC RIGHT-SIDED LOW BACK PAIN WITH RIGHT-SIDED SCIATICA: ICD-10-CM

## 2024-05-29 DIAGNOSIS — F41.1 GENERALIZED ANXIETY DISORDER: ICD-10-CM

## 2024-05-29 PROCEDURE — G0439 PPPS, SUBSEQ VISIT: HCPCS | Performed by: NURSE PRACTITIONER

## 2024-05-29 PROCEDURE — 1123F ACP DISCUSS/DSCN MKR DOCD: CPT | Performed by: NURSE PRACTITIONER

## 2024-05-29 RX ORDER — FINASTERIDE 5 MG/1
5 TABLET, FILM COATED ORAL DAILY
Qty: 90 TABLET | Refills: 3 | Status: SHIPPED | OUTPATIENT
Start: 2024-05-29

## 2024-05-30 ENCOUNTER — PATIENT MESSAGE (OUTPATIENT)
Dept: FAMILY MEDICINE CLINIC | Facility: CLINIC | Age: 84
End: 2024-05-30

## 2024-05-30 DIAGNOSIS — F41.1 GENERALIZED ANXIETY DISORDER: Primary | ICD-10-CM

## 2024-05-31 DIAGNOSIS — F41.1 GENERALIZED ANXIETY DISORDER: ICD-10-CM

## 2024-05-31 RX ORDER — LORAZEPAM 1 MG/1
1 TABLET ORAL EVERY 8 HOURS PRN
Qty: 90 TABLET | Refills: 2 | Status: SHIPPED | OUTPATIENT
Start: 2024-05-31 | End: 2024-11-27

## 2024-05-31 RX ORDER — LORAZEPAM 1 MG/1
TABLET ORAL
Qty: 90 TABLET | Refills: 1 | OUTPATIENT
Start: 2024-05-31

## 2024-05-31 NOTE — TELEPHONE ENCOUNTER
From: Sabas Maya  To: Cammie Guidry  Sent: 5/30/2024 11:03 PM EDT  Subject: Refill    Could you please send a new prescription for Loazepam. Guillaume iniguez I forgot to tell you when we had my visit on 5//29/2024. Thank you.   Sabas Maya.

## 2024-06-02 PROBLEM — G20.A1 PARKINSON'S DISEASE (HCC): Status: ACTIVE | Noted: 2024-06-02

## 2024-06-05 ENCOUNTER — HOSPITAL ENCOUNTER (OUTPATIENT)
Dept: NUCLEAR MEDICINE | Age: 84
Discharge: HOME | End: 2024-06-08
Payer: MEDICARE

## 2024-06-05 DIAGNOSIS — G20.C PRIMARY PARKINSONISM (HCC): ICD-10-CM

## 2024-06-05 PROCEDURE — 78803 RP LOCLZJ TUM SPECT 1 AREA: CPT

## 2024-06-05 PROCEDURE — A9584 IODINE I-123 IOFLUPANE: HCPCS | Performed by: PSYCHIATRY & NEUROLOGY

## 2024-06-05 PROCEDURE — 3430000000 HC RX DIAGNOSTIC RADIOPHARMACEUTICAL: Performed by: PSYCHIATRY & NEUROLOGY

## 2024-06-05 RX ADMIN — IOFLUPANE I-123 4.4 MILLICURIE: 2 INJECTION, SOLUTION INTRAVENOUS at 11:42

## 2024-07-19 DIAGNOSIS — R26.81 UNSTEADY GAIT: ICD-10-CM

## 2024-07-19 DIAGNOSIS — R25.1 TREMORS OF NERVOUS SYSTEM: ICD-10-CM

## 2024-07-19 DIAGNOSIS — G20.B2 PARKINSON'S DISEASE WITH DYSKINESIA AND FLUCTUATING MANIFESTATIONS (HCC): Primary | ICD-10-CM

## 2024-08-01 ENCOUNTER — HOSPITAL ENCOUNTER (OUTPATIENT)
Dept: PHYSICAL THERAPY | Age: 84
Setting detail: RECURRING SERIES
Discharge: HOME | End: 2024-08-04
Payer: MEDICARE

## 2024-08-01 DIAGNOSIS — G20.B2 PARKINSON'S DISEASE WITH DYSKINESIA AND FLUCTUATING MANIFESTATIONS (HCC): ICD-10-CM

## 2024-08-01 DIAGNOSIS — R26.81 UNSTEADY GAIT: Primary | ICD-10-CM

## 2024-08-01 DIAGNOSIS — M54.59 OTHER LOW BACK PAIN: ICD-10-CM

## 2024-08-01 PROCEDURE — 97110 THERAPEUTIC EXERCISES: CPT

## 2024-08-01 PROCEDURE — 97161 PT EVAL LOW COMPLEX 20 MIN: CPT

## 2024-08-01 PROCEDURE — 97162 PT EVAL MOD COMPLEX 30 MIN: CPT

## 2024-08-01 ASSESSMENT — PAIN SCALES - GENERAL: PAINLEVEL_OUTOF10: 8

## 2024-08-01 NOTE — PLAN OF CARE
Sabas Maya  : 1940  Primary: Medicare Part A And B (Medicare)  [facility] [address] [place] [address]  Phone: [phone]  Fax: [phone] Plan Frequency: 2x per week for 12 weeks    Plan of Care/Certification Expiration Date: 24        Plan of Care/Certification Expiration Date:  Plan of Care/Certification Expiration Date: 24    Frequency/Duration: Plan Frequency: 2x per week for 12 weeks      Time In/Out:   Time In: 1105  Time Out: 1145      PT Visit Info:         Visit Count:  1                OUTPATIENT PHYSICAL THERAPY:             Initial Assessment 2024               Episode (back pain, poor gait/balance)         Treatment Diagnosis:     Unsteady gait  Other low back pain  Parkinson's disease, with dyskinesia present, and  manifestations fluctuate (HCC)  Medical/Referring Diagnosis:    Parkinson's disease with dyskinesia and fluctuating manifestations (HCC)  Unsteady gait  Tremors of nervous system      Referring Physician:  Cammie Guidry APRN - CNP  MD Orders:  PT Eval and Treat   Return MD Appt:  as needed  Date of Onset:    23- chronic  Allergies:  Patient has no known allergies.  Restrictions/Precautions:    None      Medications Last Reviewed:  2024     SUBJECTIVE   History of Injury/Illness (Reason for Referral):  Pt reporting  long standing chronic low back pain.  He has had three prior lumbar surgeries and states he is fused at L3/L4 and L5/S1 region.  He has had previous therapy with success.  Still reports radicular pain in the legs.  He also had had new diagnosis of Parkinsons and would like to begin exercises for this.  He reports only a mild tremor in the left hand and has mild flat affect.  Some slowness of speech at times.    Patient Stated Goal(s):  \"to decrease pain, manage parkinsons\"  Initial Pain Level:      8/10   Post Session Pain Level:     8/10  Past Medical

## 2024-08-01 NOTE — PROGRESS NOTES
Sabas Maya  : 1940  Primary: Medicare Part A And B (Medicare)  Secondary: BCBS [facility] @ [address] [place] [address]  Phone: [phone]  Fax: [phone] Plan Frequency: 2x per week for 12 weeks  Plan of Care/Certification Expiration Date: 24        Plan of Care/Certification Expiration Date:  Plan of Care/Certification Expiration Date: 24    Frequency/Duration: Plan Frequency: 2x per week for 12 weeks      Time In/Out:   Time In: 1105  Time Out: 1145      PT Visit Info:         Visit Count:  1    OUTPATIENT PHYSICAL THERAPY:   Treatment Note 2024       Episode  (back pain, poor gait/balance)               Treatment Diagnosis:    Unsteady gait  Other low back pain  Parkinson's disease with dyskinesia and fluctuating manifestations (HCC)  Medical/Referring Diagnosis:    Parkinson's disease with dyskinesia and fluctuating manifestations (HCC)  Unsteady gait  Tremors of nervous system      Referring Physician:  Cammie Guidry APRN - CNP  MD Orders:  PT Eval and Treat   Return MD Appt:  as needed   Date of Onset:  No data recorded 23, chronic  Allergies:   Patient has no known allergies.  Restrictions/Precautions:   None      Interventions Planned (Treatment may consist of any combination of the following):     See Assessment Note    Subjective Comments:   Pt reports pain in bilateral quads today due to back pain.  Initial Pain Level::     8/10  Post Session Pain Level:       8/10  Medications Last Reviewed:  2024  Updated Objective Findings:  See Evaluation Note from today  Treatment   THERAPEUTIC EXERCISE: (25 minutes):    Exercises per grid below to improve mobility, strength, and balance.  Required minimal verbal cues to promote proper body mechanics.  Progressed resistance, range, and repetitions as indicated.  Tandem walking 2 x 25 feet  Lateral step with arm motions x 12 each side  Forward step with arm motions x

## 2024-08-07 ENCOUNTER — HOSPITAL ENCOUNTER (OUTPATIENT)
Dept: PHYSICAL THERAPY | Age: 84
Setting detail: RECURRING SERIES
Discharge: HOME | End: 2024-08-10
Payer: MEDICARE

## 2024-08-07 PROCEDURE — 97110 THERAPEUTIC EXERCISES: CPT

## 2024-08-07 NOTE — PROGRESS NOTES
motions x 10  Seated big reaching   Seated big reaching to side  Nu step   Palloff press   Big step forward and side to side  Big reacher's seated and standing  Hamstring stretching    MANUAL THERAPY: ( minutes):   Joint mobilization was utilized and necessary because of the patient's painful spasm.   Central PA's Grade II to III    Treatment/Session Summary:    Treatment Assessment: Patient tolerated treatment well today. Less tightness following therapy. Good compliance with home exercises .     Communication/Consultation:  None today  Equipment provided today:  None  Recommendations/Intent for next treatment session: Next visit will focus on big and loud exercises to help manage his Parkinson's symptoms.    >Total Treatment Billable Duration:  45 minutes   Time In: 0100  Time Out: 0145    Belen Seth PTA         Charge Capture  Events  "[employer]" Portal  Appt Desk  Attendance Report     Future Appointments   Date Time Provider Department Center   8/8/2024  2:00 PM Nafisa Wan NP-C [facility] AM[facility]   8/13/2024 11:45 AM Louisa South, PT [facility]   8/16/2024 11:00 AM Louisa South, PT [facility]   8/19/2024 11:00 AM Louisa South, PT [facility]   8/21/2024 11:00 AM Louisa oSuth, PT [facility]   8/26/2024 11:00 AM Louisa South, PT [facility]   8/28/2024 11:00 AM Louisa South, PT [facility]   9/23/2024  1:15 PM Zac Hernandez MD [facility]   11/6/2024  1:00 PM Reddy Galicia MD [facility]   11/20/2024 10:00 AM PST LAB [facility] DEP   11/29/2024  3:00 PM Cammie Guidry, APRN - CNP [facility] DEP

## 2024-08-13 ENCOUNTER — HOSPITAL ENCOUNTER (OUTPATIENT)
Dept: PHYSICAL THERAPY | Age: 84
Setting detail: RECURRING SERIES
Discharge: HOME | End: 2024-08-16
Payer: MEDICARE

## 2024-08-13 PROCEDURE — 97140 MANUAL THERAPY 1/> REGIONS: CPT

## 2024-08-13 PROCEDURE — 97110 THERAPEUTIC EXERCISES: CPT

## 2024-08-13 NOTE — PROGRESS NOTES
reaching to side x 5  Nu step level 2.5 x 10 min  Palloff press black TB x 20 each side  Big step forward and side to side x 15  Hamstring stretching-held  Square steps x 10 each way  March walk 2 x 30 feet  March walk with arm motions   Lateral walking with arm motions  MANUAL THERAPY: ( 10 minutes):   Joint mobilization was utilized and necessary because of the patient's painful spasm.   Central PA's Grade II to III    Treatment/Session Summary:    Treatment Assessment: Patient progressed well today.  Focused on balance and big and loud exercises.  One or two back exercises as pt is performing multiple back exercise at home.  He had no complaints post treatment.  Communication/Consultation:  None today  Equipment provided today:  None  Recommendations/Intent for next treatment session: continued balance, big and loud exercises, and lumbar exercises as needed  >Total Treatment Billable Duration:  45 minutes   Time In: 1140  Time Out: 1230    Louisa South PT         Charge Capture  Events  [employer] Portal  Appt Desk  Attendance Report     Future Appointments   Date Time Provider Department Center   8/16/2024 11:00 AM Louisa South PT [facility]   8/19/2024 11:00 AM Louisa South, PT [facility]   8/21/2024 11:00 AM Louisa South, PT [facility]   8/26/2024 11:00 AM Louisa South, PT [facility]   8/28/2024 11:00 AM Louisa South, PT [facility]   9/23/2024  1:15 PM Zac Hernandez MD [facility] AMB   11/6/2024  1:00 PM Reddy Galicia MD [facility] AMB   11/20/2024 10:00 AM PST LAB [facility] DEP   11/29/2024  3:00 PM Cammie Guidry, APRN - CNP [facility] DEP

## 2024-08-16 ENCOUNTER — HOSPITAL ENCOUNTER (OUTPATIENT)
Dept: PHYSICAL THERAPY | Age: 84
Setting detail: RECURRING SERIES
Discharge: HOME | End: 2024-08-19
Payer: MEDICARE

## 2024-08-16 PROCEDURE — 97110 THERAPEUTIC EXERCISES: CPT

## 2024-08-16 NOTE — PROGRESS NOTES
Sabas Maya  : 1940  Primary: Medicare Part A And B (Medicare)  Secondary: BCBS [facility] @ [address] [place] [address]  Phone: [phone]  Fax: [phone] Plan Frequency: 2x per week for 12 weeks  Plan of Care/Certification Expiration Date: 24        Plan of Care/Certification Expiration Date:  Plan of Care/Certification Expiration Date: 24    Frequency/Duration: Plan Frequency: 2x per week for 12 weeks      Time In/Out:   Time In: 1105  Time Out: 1150      PT Visit Info:         Visit Count:  4    OUTPATIENT PHYSICAL THERAPY:   Treatment Note 2024       Episode  (back pain, poor gait/balance)               Treatment Diagnosis:    Unsteady gait  Other low back pain  Parkinson's disease with dyskinesia and fluctuating manifestations (HCC)  Medical/Referring Diagnosis:    Parkinson's disease with dyskinesia and fluctuating manifestations (HCC)  Unsteady gait  Tremors of nervous system      Referring Physician:  Cammie Guidry APRN - CNP  MD Orders:  PT Eval and Treat   Return MD Appt:  as needed   Date of Onset:  No data recorded 23, chronic  Allergies:   Patient has no known allergies.  Restrictions/Precautions:   None      Interventions Planned (Treatment may consist of any combination of the following):     See Assessment Note    Subjective Comments: Patient reports he feels tired today due to medications    Initial Pain Level::     3 /10  Post Session Pain Level:       2 /10  Medications Last Reviewed:  2024  Updated Objective Findings:  None Today  Treatment   THERAPEUTIC EXERCISE: (40 minutes):    Exercises per grid below to improve mobility, strength, and balance.  Required minimal verbal cues to promote proper body mechanics.  Progressed resistance, range, and repetitions as indicated.  Tandem walking 2 x 25 feet  Lateral step with arm motions x 15 each side  Forward step with arm motions x 15  Seated big

## 2024-08-19 ENCOUNTER — HOSPITAL ENCOUNTER (OUTPATIENT)
Dept: PHYSICAL THERAPY | Age: 84
Setting detail: RECURRING SERIES
Discharge: HOME | End: 2024-08-22
Payer: MEDICARE

## 2024-08-19 PROCEDURE — 97110 THERAPEUTIC EXERCISES: CPT

## 2024-08-19 NOTE — PROGRESS NOTES
Sabas Maya  : 1940  Primary: Medicare Part A And B (Medicare)  Secondary: BCBS [facility] @ [address] [place] [address]  Phone: [phone]  Fax: [phone] Plan Frequency: 2x per week for 12 weeks  Plan of Care/Certification Expiration Date: 24        Plan of Care/Certification Expiration Date:  Plan of Care/Certification Expiration Date: 24    Frequency/Duration: Plan Frequency: 2x per week for 12 weeks      Time In/Out:          PT Visit Info:         Visit Count:  5    OUTPATIENT PHYSICAL THERAPY:   Treatment Note 2024       Episode  (back pain, poor gait/balance)               Treatment Diagnosis:    Unsteady gait  Other low back pain  Parkinson's disease with dyskinesia and fluctuating manifestations (HCC)  Medical/Referring Diagnosis:    Parkinson's disease with dyskinesia and fluctuating manifestations (HCC)  Unsteady gait  Tremors of nervous system      Referring Physician:  Cammie Guidry APRN - CNP MD Orders:  PT Eval and Treat   Return MD Appt:  as needed   Date of Onset:  No data recorded 23, chronic  Allergies:   Patient has no known allergies.  Restrictions/Precautions:   None      Interventions Planned (Treatment may consist of any combination of the following):     See Assessment Note    Subjective Comments: Patient reports he feels tired today due to medications    Initial Pain Level::     3 /10  Post Session Pain Level:       2 /10  Medications Last Reviewed:  2024  Updated Objective Findings:  None Today  Treatment   THERAPEUTIC EXERCISE: (50 minutes):    Exercises per grid below to improve mobility, strength, and balance.  Required minimal verbal cues to promote proper body mechanics.  Progressed resistance, range, and repetitions as indicated.  Tandem walking 2 x 25 feet  Lateral step with arm motions x 15 each side  Forward step with arm motions x 15  Seated big reaching x 12  Seated big reaching

## 2024-08-21 ENCOUNTER — HOSPITAL ENCOUNTER (OUTPATIENT)
Dept: PHYSICAL THERAPY | Age: 84
Setting detail: RECURRING SERIES
Discharge: HOME | End: 2024-08-24
Payer: MEDICARE

## 2024-08-21 PROCEDURE — 97110 THERAPEUTIC EXERCISES: CPT

## 2024-08-21 NOTE — PROGRESS NOTES
Sabas Maya  : 1940  Primary: Medicare Part A And B (Medicare)  Secondary: BCBS [facility] @ [address] [place] [address]  Phone: [phone]  Fax: [phone] Plan Frequency: 2x per week for 12 weeks  Plan of Care/Certification Expiration Date: 24        Plan of Care/Certification Expiration Date:  Plan of Care/Certification Expiration Date: 24    Frequency/Duration: Plan Frequency: 2x per week for 12 weeks      Time In/Out:   Time In: 1100  Time Out: 1150      PT Visit Info:         Visit Count:  6    OUTPATIENT PHYSICAL THERAPY:   Treatment Note 2024       Episode  (back pain, poor gait/balance)               Treatment Diagnosis:    Unsteady gait  Other low back pain  Parkinson's disease with dyskinesia and fluctuating manifestations (HCC)  Medical/Referring Diagnosis:    Parkinson's disease with dyskinesia and fluctuating manifestations (HCC)  Unsteady gait  Tremors of nervous system      Referring Physician:  Cammie Guidyr APRN - CNP  MD Orders:  PT Eval and Treat   Return MD Appt:  as needed   Date of Onset:  No data recorded 23, chronic  Allergies:   Patient has no known allergies.  Restrictions/Precautions:   None      Interventions Planned (Treatment may consist of any combination of the following):     See Assessment Note    Subjective Comments: Patient reports he feels okay today.  Initial Pain Level::     3 /10  Post Session Pain Level:       2 /10  Medications Last Reviewed:  2024  Updated Objective Findings:  None Today  Treatment   THERAPEUTIC EXERCISE: (50 minutes):    Exercises per grid below to improve mobility, strength, and balance.  Required minimal verbal cues to promote proper body mechanics.  Progressed resistance, range, and repetitions as indicated.  Tandem walking 2 x 25 feet  Lateral step with arm motions x 15 each side  Forward step with arm motions x 15  Seated big reaching x 12  Seated big

## 2024-08-26 ENCOUNTER — HOSPITAL ENCOUNTER (OUTPATIENT)
Dept: PHYSICAL THERAPY | Age: 84
Setting detail: RECURRING SERIES
Discharge: HOME | End: 2024-08-29
Payer: MEDICARE

## 2024-08-26 PROCEDURE — 97110 THERAPEUTIC EXERCISES: CPT

## 2024-08-26 NOTE — PROGRESS NOTES
Sabas Maya  : 1940  Primary: Medicare Part A And B (Medicare)  Secondary: BCBS [facility] @ [address] [place] [address]  Phone: [phone]  Fax: [phone] Plan Frequency: 2x per week for 12 weeks  Plan of Care/Certification Expiration Date: 24        Plan of Care/Certification Expiration Date:  Plan of Care/Certification Expiration Date: 24    Frequency/Duration: Plan Frequency: 2x per week for 12 weeks      Time In/Out:   Time In: 1100  Time Out: 1145      PT Visit Info:         Visit Count:  7    OUTPATIENT PHYSICAL THERAPY:   Treatment Note 2024       Episode  (back pain, poor gait/balance)               Treatment Diagnosis:    Unsteady gait  Other low back pain  Parkinson's disease with dyskinesia and fluctuating manifestations (HCC)  Medical/Referring Diagnosis:    Parkinson's disease with dyskinesia and fluctuating manifestations (HCC)  Unsteady gait  Tremors of nervous system      Referring Physician:  Cammie Guidry APRN - CNP  MD Orders:  PT Eval and Treat   Return MD Appt:  as needed   Date of Onset:  No data recorded 23, chronic  Allergies:   Patient has no known allergies.  Restrictions/Precautions:   None      Interventions Planned (Treatment may consist of any combination of the following):     See Assessment Note    Subjective Comments: Patient reports he is okay.  Initial Pain Level::     3 /10  Post Session Pain Level:       2 /10  Medications Last Reviewed:  2024  Updated Objective Findings:  None Today  Treatment   THERAPEUTIC EXERCISE: (45 minutes):    Exercises per grid below to improve mobility, strength, and balance.  Required minimal verbal cues to promote proper body mechanics.  Progressed resistance, range, and repetitions as indicated.  Tandem walking 2 x 25 feet  Lateral step with arm motions x 15 each side  Forward step with arm motions x 15  Seated big reaching x 12  Seated big reaching to  side x 5  Nu step level 2.5 x 10 min  Palloff press black TB x 20 each side  Big step forward and side to side x 15  Hamstring stretching-held  Square steps x 10 each way  March walk 2 x 30 feet  March walk with arm motions   Lateral walking with arm motions  Airex step ups x 20  Metronome marching 3 x 30 -held  Swiss ball overhead lifts x 12  Shuttle 137# x 20  Cone step overs x 4  Low row march blue TB x 20  MANUAL THERAPY: ( 0 minutes):   Joint mobilization was utilized and necessary because of the patient's painful spasm.   Central PA's Grade II to III    Treatment/Session Summary:    Treatment Assessment: Patient continues to report intermittent lower back pain.  Continued with some mild lower back exercises and mainly big and loud exercises to help pt continue to ambulate well and maintain balance.  Communication/Consultation:  None today  Equipment provided today:  None  Recommendations/Intent for next treatment session: continued balance, big and loud exercises, and lumbar exercises as needed  >Total Treatment Billable Duration:  45 minutes   Time In: 1100  Time Out: 1145    Louisa South PT         Charge Capture  Events  [facility]  Appt Desk  Attendance Report     Future Appointments   Date Time Provider Department Center   8/28/2024 11:00 AM Louisa South PT [facility]   9/23/2024  1:15 PM Zac Hernandez MD [facility] AMB   11/6/2024  1:00 PM [facility]   11/20/2024 10:00 AM PST LAB [facility] DEP   11/29/2024  3:00 PM Cammie Guidry, APRN - CNP PST [facility] DEP

## 2024-08-28 ENCOUNTER — HOSPITAL ENCOUNTER (OUTPATIENT)
Dept: PHYSICAL THERAPY | Age: 84
Setting detail: RECURRING SERIES
Discharge: HOME | End: 2024-08-31
Payer: MEDICARE

## 2024-08-28 PROCEDURE — 97110 THERAPEUTIC EXERCISES: CPT

## 2024-08-28 NOTE — THERAPY DISCHARGE
History/Comorbidities:   Mr. Maya  has a past medical history of Cancer (HCC), Chronic back pain, Hyperlipidemia, Hypertension, Neuropathy, and PSVT (paroxysmal supraventricular tachycardia) ([facility]).  Mr. Maya  has a past surgical history that includes bladder repair and back surgery.  Social History/Living Environment:   Patient lives with their spouse  Type of Home: House: One Story    Prior Level of Function/Work/Activity:   Prior Level of Function: Independent      Learning:   Does the patient/guardian have any barriers to learning?: No barriers  Will there be a co-learner?: No  What is the preferred language of the patient/guardian?: English  Is an  required?: No  How does the patient/guardian prefer to learn new concepts?: Listening; Demonstration; Pictures/Videos    Fall Risk Scale:   [place] Total Score: 0         OBJECTIVE   LE strength measures:     R LE L LE   Hip flexors  4+ /5    5 /5   Glut medius   4+/5   4+/5   quad  4 /5   4+/5   hamstrings  4 /5   4/5   Ankle DF's   5/5  5 /5   Ankle PF's   5/5   5/5       UE strength measures:     R UE L UE   Flex   4+/5   4+/5   Abduction   4+/5  4+ /5   ER   4+/5   4+/5   IR   4+/5   4+/5   biceps   4+/5   4+/5   triceps  4+ /5   4+/5       LROM WFL  Hip ROM WFL    Gait observation:WFL  Tandem walking: WFL    Coordination testing WFL  Rapid alternating movements of hand WFL    Mild tremor noted in left hand at rest and with activity  Outcome Measure:   Tool Used: Lower Extremity Functional Scale (LEFS)  Score:  Initial: 47/80 Most Recent: 50/80 (Date: 8/28/24 )   Interpretation of Score: 20 questions each scored on a 5 point scale with 0 representing \"extreme difficulty or unable to perform\" and 4 representing \"no difficulty\".  The lower the score, the greater the functional disability. 80/80 represents no disability.  Minimal detectable change is 9 points.    ASSESSMENT   Initial Assessment:  Pt reports chronic lower back pain.  He continues to have  radicular pain in bilateral quads.  Also reports diagnosis of Parkinson's and has light tremor in left hand.  He is very active and performs lumbar exercises daily and mows is own grass and does outside jobs around his home.  Would like to learn exercises to help manage the Parkinson's.  Assessment as of 8/28/24:  Pt able to progress with various big and loud exercises, balance and core exercises.  He has a good understanding of all exercises and he can incorporate these into his existing exercise routine.  He has been able to remain active and continue work in the home, on his truck and in the yard.  Therapy Problem List: (Impacting functional limitations):    Increased Pain, Decreased Strength, Decreased [place] with Home Exercise Program, Decreased Posture, and Decreased Balance   Therapy Prognosis:   Fair     Initial Assessment Complexity:   Moderate Complexity       PLAN   Effective Dates: 8/1/2024 TO Plan of Care/Certification Expiration Date: 11/01/24     Frequency/Duration: Plan Frequency: 2x per week for 12 weeks      Interventions Planned (Treatment may consist of any combination of the following):    Balance Training, Functional Mobility Training, Gait Training, Home Exercise Program (HEP), Manual Therapy, Therapeutic Activites, and Therapeutic Exercise/Strengthening   Goals: (Goals have been discussed and agreed upon with patient.)  Short-Term Functional Goals: Time Frame: 6 weeks  Pt will be independent with HEP.-met  Pt will be able to report standing up to 20 minutes prior to onset of lumbar pain.-met  Pt will be able to increase strength by 1/2 grade.- in progress  Discharge Goals: Time Frame: 12 weeks  Pt will be able to demonstrate big and loud exercises to help manage Parkinson's symptoms.-met  Pt will be able to increase LEFS by at least 7 points for advanced ADL's.- in progress  Pt will be able to walk up to 20-30 minutes without increased pain, gait issues.-met         Regarding Sabas

## 2024-08-28 NOTE — PROGRESS NOTES
Rest and drink plenty of fluids. A cool mist humidifier can be helpful.  If you develop a worsening cough,shortness of breath, prolonged high fever, decreased fluid intake or urination, any new or concerning symptoms please return or proceed ER.  Recommend following up with PCP in 3-5 days.    Can use nose myriam or bulb syringe for nasal drainage.      Take medication as directed. Tylenol and motrin as needed   Sabas Maya  : 1940  Primary: Medicare Part A And B (Medicare)  Secondary: BCBS [facility] @ [address] [place] [address]  Phone: [phone]  Fax: [phone] Plan Frequency: 2x per week for 12 weeks  Plan of Care/Certification Expiration Date: 24        Plan of Care/Certification Expiration Date:  Plan of Care/Certification Expiration Date: 24    Frequency/Duration: Plan Frequency: 2x per week for 12 weeks      Time In/Out:   Time In: 1100  Time Out: 1145      PT Visit Info:         Visit Count:  8    OUTPATIENT PHYSICAL THERAPY:   Treatment Note 2024       Episode  (back pain, poor gait/balance)               Treatment Diagnosis:    Unsteady gait  Other low back pain  Parkinson's disease with dyskinesia and fluctuating manifestations (HCC)  Medical/Referring Diagnosis:    Parkinson's disease with dyskinesia and fluctuating manifestations (HCC)  Unsteady gait  Tremors of nervous system      Referring Physician:  Cammie Guidry APRN - CNP  MD Orders:  PT Eval and Treat   Return MD Appt:  as needed   Date of Onset:  No data recorded 23, chronic  Allergies:   Patient has no known allergies.  Restrictions/Precautions:   None      Interventions Planned (Treatment may consist of any combination of the following):     See Assessment Note    Subjective Comments: Patient reports he is okay today.  He took an aleve this morning to help lower his back pain and it has helped.    Initial Pain Level::     3 /10  Post Session Pain Level:       2 /10  Medications Last Reviewed:  2024  Updated Objective Findings:  None Today  Treatment   THERAPEUTIC EXERCISE: (45 minutes):    Exercises per grid below to improve mobility, strength, and balance.  Required minimal verbal cues to promote proper body mechanics.  Progressed resistance, range, and repetitions as indicated.  Tandem walking 2 x 25 feet  Lateral step with arm motions x 15 each  side  Forward step with arm motions x 15  Seated big reaching x 12  Seated big reaching to side x 5  Nu step level 2.5 x 10 min  Palloff press black TB x 20 each side  Big step forward and side to side x 15  Hamstring stretching-held  Square steps x 10 each way  March walk 2 x 30 feet  March walk with arm motions   Lateral walking with arm motions  Airex step ups x 20  Metronome marching 3 x 30 -held  Swiss ball overhead lifts x 12  Shuttle 137# x 20  Cone step overs x 4  Low row march blue TB x 20  Quick steps x 20 lateral and backwards  MANUAL THERAPY: ( 0 minutes):   Joint mobilization was utilized and necessary because of the patient's painful spasm.   Central PA's Grade II to III    Treatment/Session Summary:    Treatment Assessment: Patient able to have a good understanding of all exercises and he is ready for discharge as he can incorporate big and loud exercises into his daily routine.  Communication/Consultation:  None today  Equipment provided today:  None    >Total Treatment Billable Duration:  45 minutes   Time In: 1100  Time Out: 1145    Louisa South PT         Charge Capture  Events  [facility] Portal  Appt Desk  Attendance Report     Future Appointments   Date Time Provider Department Center   9/23/2024  1:15 PM Zac Hernandez MD [facility] AMB   11/6/2024  1:00 PM Reddy Galicia MD [facility]   11/20/2024 10:00 AM PST LAB PST [facility] DEP   11/29/2024  3:00 PM Cammie Guidry, APRN - CNP PST [facility] DEP

## 2024-09-23 ENCOUNTER — OFFICE VISIT (OUTPATIENT)
Age: 84
End: 2024-09-23
Payer: MEDICARE

## 2024-09-23 VITALS
DIASTOLIC BLOOD PRESSURE: 70 MMHG | WEIGHT: 193 LBS | HEIGHT: 70 IN | BODY MASS INDEX: 27.63 KG/M2 | HEART RATE: 72 BPM | SYSTOLIC BLOOD PRESSURE: 112 MMHG

## 2024-09-23 DIAGNOSIS — R07.9 CHEST PAIN, UNSPECIFIED TYPE: ICD-10-CM

## 2024-09-23 DIAGNOSIS — I10 ESSENTIAL HYPERTENSION: Primary | ICD-10-CM

## 2024-09-23 PROCEDURE — 1036F TOBACCO NON-USER: CPT | Performed by: INTERNAL MEDICINE

## 2024-09-23 PROCEDURE — G8417 CALC BMI ABV UP PARAM F/U: HCPCS | Performed by: INTERNAL MEDICINE

## 2024-09-23 PROCEDURE — 3078F DIAST BP <80 MM HG: CPT | Performed by: INTERNAL MEDICINE

## 2024-09-23 PROCEDURE — 1123F ACP DISCUSS/DSCN MKR DOCD: CPT | Performed by: INTERNAL MEDICINE

## 2024-09-23 PROCEDURE — G8427 DOCREV CUR MEDS BY ELIG CLIN: HCPCS | Performed by: INTERNAL MEDICINE

## 2024-09-23 PROCEDURE — 99214 OFFICE O/P EST MOD 30 MIN: CPT | Performed by: INTERNAL MEDICINE

## 2024-09-23 PROCEDURE — 3074F SYST BP LT 130 MM HG: CPT | Performed by: INTERNAL MEDICINE

## 2024-09-23 RX ORDER — FINASTERIDE 5 MG/1
5 TABLET, FILM COATED ORAL DAILY
Qty: 90 TABLET | Refills: 3 | Status: SHIPPED | OUTPATIENT
Start: 2024-09-23

## 2024-09-23 ASSESSMENT — ENCOUNTER SYMPTOMS
HEMATEMESIS: 0
COLOR CHANGE: 0
VOMITING: 0
ABDOMINAL PAIN: 0
SPUTUM PRODUCTION: 0
SHORTNESS OF BREATH: 0
BACK PAIN: 0
HEMATOCHEZIA: 0
HEMOPTYSIS: 0
BLURRED VISION: 0
BOWEL INCONTINENCE: 0
HOARSE VOICE: 0
COUGH: 0
ORTHOPNEA: 0
DIARRHEA: 0
NAUSEA: 0
WHEEZING: 0

## 2024-10-24 DIAGNOSIS — G20.C PRIMARY PARKINSONISM (HCC): ICD-10-CM

## 2024-10-24 RX ORDER — CARBIDOPA AND LEVODOPA 25; 100 MG/1; MG/1
1 TABLET ORAL 2 TIMES DAILY
Qty: 60 TABLET | Refills: 5 | Status: SHIPPED | OUTPATIENT
Start: 2024-10-24

## 2024-11-06 ENCOUNTER — OFFICE VISIT (OUTPATIENT)
Dept: NEUROLOGY | Age: 84
End: 2024-11-06
Payer: MEDICARE

## 2024-11-06 VITALS
HEART RATE: 90 BPM | SYSTOLIC BLOOD PRESSURE: 114 MMHG | BODY MASS INDEX: 27.69 KG/M2 | HEIGHT: 70 IN | DIASTOLIC BLOOD PRESSURE: 77 MMHG

## 2024-11-06 DIAGNOSIS — R49.8 HYPOPHONIA: ICD-10-CM

## 2024-11-06 DIAGNOSIS — G62.9 PERIPHERAL POLYNEUROPATHY: ICD-10-CM

## 2024-11-06 DIAGNOSIS — R26.9 GAIT DISTURBANCE: ICD-10-CM

## 2024-11-06 DIAGNOSIS — G20.A1 PARKINSON'S DISEASE WITHOUT DYSKINESIA OR FLUCTUATING MANIFESTATIONS (HCC): Primary | ICD-10-CM

## 2024-11-06 PROCEDURE — 3078F DIAST BP <80 MM HG: CPT | Performed by: PSYCHIATRY & NEUROLOGY

## 2024-11-06 PROCEDURE — 1123F ACP DISCUSS/DSCN MKR DOCD: CPT | Performed by: PSYCHIATRY & NEUROLOGY

## 2024-11-06 PROCEDURE — G8417 CALC BMI ABV UP PARAM F/U: HCPCS | Performed by: PSYCHIATRY & NEUROLOGY

## 2024-11-06 PROCEDURE — 1159F MED LIST DOCD IN RCRD: CPT | Performed by: PSYCHIATRY & NEUROLOGY

## 2024-11-06 PROCEDURE — G8427 DOCREV CUR MEDS BY ELIG CLIN: HCPCS | Performed by: PSYCHIATRY & NEUROLOGY

## 2024-11-06 PROCEDURE — 99214 OFFICE O/P EST MOD 30 MIN: CPT | Performed by: PSYCHIATRY & NEUROLOGY

## 2024-11-06 PROCEDURE — G8484 FLU IMMUNIZE NO ADMIN: HCPCS | Performed by: PSYCHIATRY & NEUROLOGY

## 2024-11-06 PROCEDURE — 1036F TOBACCO NON-USER: CPT | Performed by: PSYCHIATRY & NEUROLOGY

## 2024-11-06 PROCEDURE — 3074F SYST BP LT 130 MM HG: CPT | Performed by: PSYCHIATRY & NEUROLOGY

## 2024-11-06 RX ORDER — CARBIDOPA AND LEVODOPA 25; 100 MG/1; MG/1
1 TABLET, EXTENDED RELEASE ORAL 2 TIMES DAILY
Qty: 60 TABLET | Refills: 5 | Status: SHIPPED | OUTPATIENT
Start: 2024-11-06

## 2024-11-06 ASSESSMENT — ENCOUNTER SYMPTOMS
COUGH: 0
TROUBLE SWALLOWING: 1
VOICE CHANGE: 1
CONSTIPATION: 1

## 2024-11-06 NOTE — PROGRESS NOTES
ALLERGIES:  No Known Allergies      Physical Exam:     /77   Pulse 90   Ht 1.778 m (5' 10\")   BMI 27.69 kg/m²     General Exam:  General: Well developed, well nourished, in no apparent distress.   HEENT: Normocephalic, atraumatic. Sclera anicteric. Oropharynx clear.   Neck: Supple without masses  Cardiovascular: Regular rate and rhythm. No carotid bruits.   Lungs: Non-labored breathing.   Abdomen: Soft, nontender, nondistended.   Extremities: Peripheral pulses intact. No edema and no rashes.     Neurological Exam:     MS/Language/Speech:  Alert. Oriented to person, place, and today's date. He is able to state the months backwards accurately. Language fluent. Speech hypophonic.    Cranial Nerves: PERRL. Eye movements full with normal pursuits. No nystagmus.  Slight loss of facial expression.  No facial asymmetry. Tongue and palate were midline. Shoulder shrug symmetric.    Motor: Strength was full in all proximal and distal muscle groups.  There is minimal rigidity of the right upper extremity and mild cogwheeling rigidity in the left upper extremity.  There is mild spasticity noted in the lower extremities bilaterally.    Abnormal Movements: There is an intermittent low amplitude resting tremor of the left upper extremity.  With hands outstretched there is a subtle postural tremor of the left hand.  No tremor of the right hand.  With finger-nose-finger there is only a subtle action tremor on the left and no tremor on the right.    Sensory: Normal to light touch throughout.    Cerebellar: No ataxia or dysmetria with finger-nose-finger bilaterally.      Reflexes (R/L): Biceps (3+/3+), Brachioradialis (2+/2+), Patellar (3+/0+), Ankle (3+/3+). Allen's was negative.  Few beats of ankle clonus noted bilaterally.     Gait: He can rise from a seated position without difficulty.  His posture is mildly stooped.  He does walk with a mildly slowed gait.  There is nearly absent arm swing on the left.  Turns are

## 2024-11-20 ENCOUNTER — LAB (OUTPATIENT)
Dept: FAMILY MEDICINE CLINIC | Facility: CLINIC | Age: 84
End: 2024-11-20
Payer: MEDICARE

## 2024-11-20 DIAGNOSIS — I10 ESSENTIAL (PRIMARY) HYPERTENSION: ICD-10-CM

## 2024-11-20 DIAGNOSIS — N18.32 STAGE 3B CHRONIC KIDNEY DISEASE (HCC): ICD-10-CM

## 2024-11-20 DIAGNOSIS — G20.A1 PARKINSON'S DISEASE, UNSPECIFIED WHETHER DYSKINESIA PRESENT, UNSPECIFIED WHETHER MANIFESTATIONS FLUCTUATE (HCC): ICD-10-CM

## 2024-11-20 DIAGNOSIS — E55.9 VITAMIN D DEFICIENCY: ICD-10-CM

## 2024-11-20 DIAGNOSIS — E78.00 HYPERCHOLESTEROLEMIA: ICD-10-CM

## 2024-11-20 DIAGNOSIS — R73.09 OTHER ABNORMAL GLUCOSE: ICD-10-CM

## 2024-11-20 LAB
25(OH)D3 SERPL-MCNC: 83.1 NG/ML (ref 30–100)
ALBUMIN SERPL-MCNC: 3.5 G/DL (ref 3.2–4.6)
ALBUMIN/GLOB SERPL: 1.2 (ref 1–1.9)
ALP SERPL-CCNC: 96 U/L (ref 40–129)
ALT SERPL-CCNC: 12 U/L (ref 8–55)
ANION GAP SERPL CALC-SCNC: 9 MMOL/L (ref 7–16)
AST SERPL-CCNC: 25 U/L (ref 15–37)
BASOPHILS # BLD: 0.1 K/UL (ref 0–0.2)
BASOPHILS NFR BLD: 1 % (ref 0–2)
BILIRUB SERPL-MCNC: 0.6 MG/DL (ref 0–1.2)
BILIRUBIN, URINE, POC: NEGATIVE
BLOOD URINE, POC: NEGATIVE
BUN SERPL-MCNC: 30 MG/DL (ref 8–23)
CALCIUM SERPL-MCNC: 9.5 MG/DL (ref 8.8–10.2)
CHLORIDE SERPL-SCNC: 101 MMOL/L (ref 98–107)
CHOLEST SERPL-MCNC: 122 MG/DL (ref 0–200)
CO2 SERPL-SCNC: 31 MMOL/L (ref 20–29)
CREAT SERPL-MCNC: 1.29 MG/DL (ref 0.8–1.3)
DIFFERENTIAL METHOD BLD: ABNORMAL
EOSINOPHIL # BLD: 0.3 K/UL (ref 0–0.8)
EOSINOPHIL NFR BLD: 2 % (ref 0.5–7.8)
ERYTHROCYTE [DISTWIDTH] IN BLOOD BY AUTOMATED COUNT: 12.6 % (ref 11.9–14.6)
EST. AVERAGE GLUCOSE BLD GHB EST-MCNC: 120 MG/DL
GLOBULIN SER CALC-MCNC: 3 G/DL (ref 2.3–3.5)
GLUCOSE SERPL-MCNC: 95 MG/DL (ref 70–99)
GLUCOSE URINE, POC: NEGATIVE
HBA1C MFR BLD: 5.8 % (ref 0–5.6)
HCT VFR BLD AUTO: 51.7 % (ref 41.1–50.3)
HDLC SERPL-MCNC: 46 MG/DL (ref 40–60)
HDLC SERPL: 2.7 (ref 0–5)
HGB BLD-MCNC: 16.8 G/DL (ref 13.6–17.2)
IMM GRANULOCYTES # BLD AUTO: 0.1 K/UL (ref 0–0.5)
IMM GRANULOCYTES NFR BLD AUTO: 1 % (ref 0–5)
KETONES, URINE, POC: NEGATIVE
LDLC SERPL CALC-MCNC: 44 MG/DL (ref 0–100)
LEUKOCYTE ESTERASE, URINE, POC: NEGATIVE
LYMPHOCYTES # BLD: 3 K/UL (ref 0.5–4.6)
LYMPHOCYTES NFR BLD: 26 % (ref 13–44)
MAGNESIUM SERPL-MCNC: 2.2 MG/DL (ref 1.8–2.4)
MCH RBC QN AUTO: 32.6 PG (ref 26.1–32.9)
MCHC RBC AUTO-ENTMCNC: 32.5 G/DL (ref 31.4–35)
MCV RBC AUTO: 100.2 FL (ref 82–102)
MONOCYTES # BLD: 0.9 K/UL (ref 0.1–1.3)
MONOCYTES NFR BLD: 8 % (ref 4–12)
NEUTS SEG # BLD: 7.3 K/UL (ref 1.7–8.2)
NEUTS SEG NFR BLD: 62 % (ref 43–78)
NITRITE, URINE, POC: NEGATIVE
NRBC # BLD: 0 K/UL (ref 0–0.2)
PH, URINE, POC: 5.5 (ref 4.6–8)
PLATELET # BLD AUTO: 265 K/UL (ref 150–450)
PMV BLD AUTO: 9.5 FL (ref 9.4–12.3)
POTASSIUM SERPL-SCNC: 4.4 MMOL/L (ref 3.5–5.1)
PROT SERPL-MCNC: 6.5 G/DL (ref 6.3–8.2)
PROTEIN,URINE, POC: NEGATIVE
RBC # BLD AUTO: 5.16 M/UL (ref 4.23–5.6)
SODIUM SERPL-SCNC: 141 MMOL/L (ref 136–145)
SPECIFIC GRAVITY, URINE, POC: 1.02 (ref 1–1.03)
TRIGL SERPL-MCNC: 160 MG/DL (ref 0–150)
TSH W FREE THYROID IF ABNORMAL: 1.56 UIU/ML (ref 0.27–4.2)
URINALYSIS CLARITY, POC: CLEAR
URINALYSIS COLOR, POC: YELLOW
UROBILINOGEN, POC: NORMAL
VIT B12 SERPL-MCNC: 818 PG/ML (ref 193–986)
VLDLC SERPL CALC-MCNC: 32 MG/DL (ref 6–23)
WBC # BLD AUTO: 11.5 K/UL (ref 4.3–11.1)

## 2024-11-20 PROCEDURE — 81003 URINALYSIS AUTO W/O SCOPE: CPT | Performed by: NURSE PRACTITIONER

## 2024-12-06 ENCOUNTER — OFFICE VISIT (OUTPATIENT)
Dept: FAMILY MEDICINE CLINIC | Facility: CLINIC | Age: 84
End: 2024-12-06
Payer: MEDICARE

## 2024-12-06 VITALS
WEIGHT: 192.3 LBS | HEART RATE: 83 BPM | DIASTOLIC BLOOD PRESSURE: 78 MMHG | HEIGHT: 70 IN | BODY MASS INDEX: 27.53 KG/M2 | OXYGEN SATURATION: 97 % | SYSTOLIC BLOOD PRESSURE: 118 MMHG

## 2024-12-06 DIAGNOSIS — I10 ESSENTIAL (PRIMARY) HYPERTENSION: ICD-10-CM

## 2024-12-06 DIAGNOSIS — M54.42 CHRONIC LEFT-SIDED LOW BACK PAIN WITH LEFT-SIDED SCIATICA: Primary | ICD-10-CM

## 2024-12-06 DIAGNOSIS — F41.1 GENERALIZED ANXIETY DISORDER: ICD-10-CM

## 2024-12-06 DIAGNOSIS — R73.09 OTHER ABNORMAL GLUCOSE: ICD-10-CM

## 2024-12-06 DIAGNOSIS — Z12.5 ENCOUNTER FOR PROSTATE CANCER SCREENING: ICD-10-CM

## 2024-12-06 DIAGNOSIS — E55.9 VITAMIN D DEFICIENCY: ICD-10-CM

## 2024-12-06 DIAGNOSIS — E78.00 HYPERCHOLESTEROLEMIA: ICD-10-CM

## 2024-12-06 DIAGNOSIS — G89.29 CHRONIC LEFT-SIDED LOW BACK PAIN WITH LEFT-SIDED SCIATICA: Primary | ICD-10-CM

## 2024-12-06 DIAGNOSIS — I47.10 PSVT (PAROXYSMAL SUPRAVENTRICULAR TACHYCARDIA) (HCC): ICD-10-CM

## 2024-12-06 LAB — PSA SERPL-MCNC: 3.3 NG/ML (ref 0–4)

## 2024-12-06 PROCEDURE — G8417 CALC BMI ABV UP PARAM F/U: HCPCS | Performed by: NURSE PRACTITIONER

## 2024-12-06 PROCEDURE — 3078F DIAST BP <80 MM HG: CPT | Performed by: NURSE PRACTITIONER

## 2024-12-06 PROCEDURE — 3074F SYST BP LT 130 MM HG: CPT | Performed by: NURSE PRACTITIONER

## 2024-12-06 PROCEDURE — 1123F ACP DISCUSS/DSCN MKR DOCD: CPT | Performed by: NURSE PRACTITIONER

## 2024-12-06 PROCEDURE — G8427 DOCREV CUR MEDS BY ELIG CLIN: HCPCS | Performed by: NURSE PRACTITIONER

## 2024-12-06 PROCEDURE — G8484 FLU IMMUNIZE NO ADMIN: HCPCS | Performed by: NURSE PRACTITIONER

## 2024-12-06 PROCEDURE — 96372 THER/PROPH/DIAG INJ SC/IM: CPT | Performed by: NURSE PRACTITIONER

## 2024-12-06 PROCEDURE — 1036F TOBACCO NON-USER: CPT | Performed by: NURSE PRACTITIONER

## 2024-12-06 PROCEDURE — 1159F MED LIST DOCD IN RCRD: CPT | Performed by: NURSE PRACTITIONER

## 2024-12-06 PROCEDURE — 99214 OFFICE O/P EST MOD 30 MIN: CPT | Performed by: NURSE PRACTITIONER

## 2024-12-06 PROCEDURE — 1160F RVW MEDS BY RX/DR IN RCRD: CPT | Performed by: NURSE PRACTITIONER

## 2024-12-06 RX ORDER — PREGABALIN 50 MG/1
50 CAPSULE ORAL 2 TIMES DAILY
Qty: 60 CAPSULE | Refills: 0 | Status: SHIPPED | OUTPATIENT
Start: 2024-12-06 | End: 2025-01-05

## 2024-12-06 RX ORDER — KETOROLAC TROMETHAMINE 30 MG/ML
60 INJECTION, SOLUTION INTRAMUSCULAR; INTRAVENOUS ONCE
Status: COMPLETED | OUTPATIENT
Start: 2024-12-06 | End: 2024-12-06

## 2024-12-06 RX ORDER — TRIAMCINOLONE ACETONIDE 40 MG/ML
40 INJECTION, SUSPENSION INTRA-ARTICULAR; INTRAMUSCULAR ONCE
Status: COMPLETED | OUTPATIENT
Start: 2024-12-06 | End: 2024-12-06

## 2024-12-06 RX ADMIN — KETOROLAC TROMETHAMINE 60 MG: 30 INJECTION, SOLUTION INTRAMUSCULAR; INTRAVENOUS at 16:13

## 2024-12-06 RX ADMIN — TRIAMCINOLONE ACETONIDE 40 MG: 40 INJECTION, SUSPENSION INTRA-ARTICULAR; INTRAMUSCULAR at 16:14

## 2024-12-06 ASSESSMENT — PATIENT HEALTH QUESTIONNAIRE - PHQ9
SUM OF ALL RESPONSES TO PHQ QUESTIONS 1-9: 0
1. LITTLE INTEREST OR PLEASURE IN DOING THINGS: NOT AT ALL
SUM OF ALL RESPONSES TO PHQ QUESTIONS 1-9: 0
SUM OF ALL RESPONSES TO PHQ QUESTIONS 1-9: 0
SUM OF ALL RESPONSES TO PHQ9 QUESTIONS 1 & 2: 0
2. FEELING DOWN, DEPRESSED OR HOPELESS: NOT AT ALL
SUM OF ALL RESPONSES TO PHQ QUESTIONS 1-9: 0

## 2024-12-13 ENCOUNTER — TELEPHONE (OUTPATIENT)
Dept: FAMILY MEDICINE CLINIC | Facility: CLINIC | Age: 84
End: 2024-12-13

## 2024-12-13 ASSESSMENT — ENCOUNTER SYMPTOMS
BLOOD IN STOOL: 0
PHOTOPHOBIA: 0
EYE PAIN: 0
EYE ITCHING: 0
BACK PAIN: 1
SINUS PRESSURE: 0
ABDOMINAL PAIN: 0
ALLERGIC/IMMUNOLOGIC NEGATIVE: 1
SORE THROAT: 0
CHOKING: 0
CONSTIPATION: 0
WHEEZING: 0
VOMITING: 0
VOICE CHANGE: 0
CHEST TIGHTNESS: 0
COUGH: 0
APNEA: 0
COLOR CHANGE: 0
FACIAL SWELLING: 0
GASTROINTESTINAL NEGATIVE: 1
DIARRHEA: 0
RECTAL PAIN: 0
STRIDOR: 0
EYES NEGATIVE: 1
EYE DISCHARGE: 0
SINUS PAIN: 0
ANAL BLEEDING: 0
ABDOMINAL DISTENTION: 0
TROUBLE SWALLOWING: 0
EYE REDNESS: 0
RESPIRATORY NEGATIVE: 1
NAUSEA: 0
SHORTNESS OF BREATH: 0
RHINORRHEA: 0

## 2024-12-13 NOTE — PROGRESS NOTES
\"Have you been to the ER, urgent care clinic since your last visit?  Hospitalized since your last visit?\"    NO    “Have you seen or consulted any other health care providers outside our system since your last visit?”    NO            
for Mr. Maya. Thank you! 1 each 0    b complex vitamins capsule Take 1 capsule by mouth daily      acetaminophen (TYLENOL) 500 MG tablet Take 1 tablet by mouth every 6 hours as needed for Pain      fluorouracil (EFUDEX) 5 % cream       Multiple Vitamins-Minerals (MULTIVITAMIN ADULTS 50+ PO) Multivitamin 50 Plus   ONCE DAILY      VITAMIN D PO Vitamin D   1 QD      ZINC PO zinc   1 QD       No current facility-administered medications for this visit.     No Known Allergies  Patient Active Problem List   Diagnosis    Coronary atherosclerosis    Wears dentures    Hearing aid worn    History of malignant neoplasm of skin    Long term (current) use of non-steroidal anti-inflammatories (nsaid)    Urolith    Anxiety    Benign neoplasm of colon    Benign prostatic hyperplasia with urinary obstruction    Bilateral tinnitus    Carotid artery stenosis    Diastolic dysfunction    Dyspnea on exertion    Essential hypertension    Gastroesophageal reflux disease    Hearing loss    Hemorrhoids    Herniated lumbar intervertebral disc    Hiatal hernia    Hypercholesterolemia    Low back pain    Neuropathy    Overweight with body mass index (BMI) 25.0-29.9    Raised prostate specific antigen    Spinal stenosis in cervical region    Spinal stenosis of lumbar region    Stage 3 chronic kidney disease (HCC)    Tobacco dependence in remission    Chronic renal disease, stage III (HCC)    PVC (premature ventricular contraction)    Murmur    PSVT (paroxysmal supraventricular tachycardia) (HCC)    Parkinson's disease    Chest pain     Past Medical History:   Diagnosis Date    Cancer (HCC)     Bladder cancer    Chronic back pain     Hyperlipidemia     Hypertension     Neuropathy 1990    PSVT (paroxysmal supraventricular tachycardia) (HCC) 03/08/2023     Past Surgical History:   Procedure Laterality Date    BACK SURGERY      Back surgery x 3    BLADDER REPAIR       Family History   Problem Relation Age of Onset    Cancer Mother         bone

## 2024-12-13 NOTE — TELEPHONE ENCOUNTER
Spoke with patients wife, she stated that he was doing a little better until last night when he was getting into bed and hurt it again. He is going for MRI tomorrow if he can do it.

## 2024-12-14 ENCOUNTER — HOSPITAL ENCOUNTER (OUTPATIENT)
Dept: MRI IMAGING | Age: 84
Discharge: HOME | End: 2024-12-17
Payer: MEDICARE

## 2024-12-14 DIAGNOSIS — M54.42 CHRONIC LEFT-SIDED LOW BACK PAIN WITH LEFT-SIDED SCIATICA: ICD-10-CM

## 2024-12-14 DIAGNOSIS — G89.29 CHRONIC LEFT-SIDED LOW BACK PAIN WITH LEFT-SIDED SCIATICA: ICD-10-CM

## 2024-12-14 PROCEDURE — 72148 MRI LUMBAR SPINE W/O DYE: CPT

## 2025-01-02 DIAGNOSIS — I10 ESSENTIAL (PRIMARY) HYPERTENSION: ICD-10-CM

## 2025-01-02 DIAGNOSIS — F41.1 GENERALIZED ANXIETY DISORDER: ICD-10-CM

## 2025-01-02 RX ORDER — DULOXETIN HYDROCHLORIDE 60 MG/1
60 CAPSULE, DELAYED RELEASE ORAL DAILY
Qty: 100 CAPSULE | Refills: 3 | Status: SHIPPED | OUTPATIENT
Start: 2025-01-02

## 2025-01-02 RX ORDER — METOPROLOL SUCCINATE 50 MG/1
50 TABLET, EXTENDED RELEASE ORAL DAILY
Qty: 100 TABLET | Refills: 3 | Status: SHIPPED | OUTPATIENT
Start: 2025-01-02

## 2025-01-09 ENCOUNTER — OFFICE VISIT (OUTPATIENT)
Age: 85
End: 2025-01-09
Payer: MEDICARE

## 2025-01-09 VITALS
BODY MASS INDEX: 28.35 KG/M2 | SYSTOLIC BLOOD PRESSURE: 98 MMHG | WEIGHT: 198 LBS | DIASTOLIC BLOOD PRESSURE: 64 MMHG | HEIGHT: 70 IN | HEART RATE: 76 BPM

## 2025-01-09 DIAGNOSIS — R07.9 CHEST PAIN, UNSPECIFIED TYPE: ICD-10-CM

## 2025-01-09 DIAGNOSIS — I49.3 PVC (PREMATURE VENTRICULAR CONTRACTION): Primary | ICD-10-CM

## 2025-01-09 DIAGNOSIS — I47.10 PSVT (PAROXYSMAL SUPRAVENTRICULAR TACHYCARDIA) (HCC): ICD-10-CM

## 2025-01-09 PROCEDURE — 1123F ACP DISCUSS/DSCN MKR DOCD: CPT | Performed by: INTERNAL MEDICINE

## 2025-01-09 PROCEDURE — 1160F RVW MEDS BY RX/DR IN RCRD: CPT | Performed by: INTERNAL MEDICINE

## 2025-01-09 PROCEDURE — 3074F SYST BP LT 130 MM HG: CPT | Performed by: INTERNAL MEDICINE

## 2025-01-09 PROCEDURE — 1159F MED LIST DOCD IN RCRD: CPT | Performed by: INTERNAL MEDICINE

## 2025-01-09 PROCEDURE — 1126F AMNT PAIN NOTED NONE PRSNT: CPT | Performed by: INTERNAL MEDICINE

## 2025-01-09 PROCEDURE — M1308 PR FLU IMMUNIZE NO ADMIN: HCPCS | Performed by: INTERNAL MEDICINE

## 2025-01-09 PROCEDURE — G8427 DOCREV CUR MEDS BY ELIG CLIN: HCPCS | Performed by: INTERNAL MEDICINE

## 2025-01-09 PROCEDURE — G8417 CALC BMI ABV UP PARAM F/U: HCPCS | Performed by: INTERNAL MEDICINE

## 2025-01-09 PROCEDURE — 99214 OFFICE O/P EST MOD 30 MIN: CPT | Performed by: INTERNAL MEDICINE

## 2025-01-09 PROCEDURE — 1036F TOBACCO NON-USER: CPT | Performed by: INTERNAL MEDICINE

## 2025-01-09 PROCEDURE — 3078F DIAST BP <80 MM HG: CPT | Performed by: INTERNAL MEDICINE

## 2025-01-09 ASSESSMENT — ENCOUNTER SYMPTOMS
SHORTNESS OF BREATH: 0
WHEEZING: 0
COLOR CHANGE: 0
ABDOMINAL PAIN: 0
ORTHOPNEA: 0
SPUTUM PRODUCTION: 0
BACK PAIN: 0
BLURRED VISION: 0
NAUSEA: 0
BOWEL INCONTINENCE: 0
DIARRHEA: 0
HEMATEMESIS: 0
HEMATOCHEZIA: 0
VOMITING: 0
COUGH: 0
HOARSE VOICE: 0
HEMOPTYSIS: 0

## 2025-01-09 NOTE — PROGRESS NOTES
[facility] CARDIOLOGY  [address] [place] [address], [address]  [place] [address]  PHONE: [phone]        25        NAME:  Sabas Maya  : 1940  MRN: [id number]       SUBJECTIVE:   Sabas Maya is a 84 y.o. male seen for a follow up visit regarding the following: The atient has a hx of PVC's,PSVT,primary hypertension,and Parkinson 's Disease.On his last visit,he reported occasional left sided chest pain.Follow up Lexiscan on 24 reported no reversible ischemia with LV EF=67% consistent with a low risk CV study study.He returns for follow up.I discussed recent MPI scan results with the patient and his wife.Overall,he reports doing ok.He describes occasional chest pain when he raises his right arm.    Chief Complaint   Patient presents with    Results     Had Replaced by [facility]       HPI:    Chest Pain   This is a recurrent problem. The problem has been unchanged. The pain is at a severity of 1/10. The pain is mild. The pain does not radiate. Pertinent negatives include no abdominal pain, back pain, claudication, cough, diaphoresis, dizziness, exertional chest pressure, fever, headaches, hemoptysis, irregular heartbeat, leg pain, lower extremity edema, malaise/fatigue, nausea, near-syncope, numbness, orthopnea, palpitations, PND, shortness of breath, sputum production, syncope, vomiting or weakness. The pain is aggravated by lifting arm. He has tried nothing for the symptoms.       Past Medical History, Past Surgical History, Family history, Social History, and Medications were all reviewed with the patient today and updated as necessary.         Current Outpatient Medications:     metoprolol succinate (TOPROL XL) 50 MG extended release tablet, TAKE ONE TABLET BY MOUTH ONE TIME DAILY, Disp: 100 tablet, Rfl: 3    DULoxetine (CYMBALTA) 60 MG extended release capsule, TAKE ONE CAPSULE BY MOUTH ONE TIME DAILY, Disp: 100 capsule, Rfl: 3    carbidopa-levodopa (SINEMET CR)  MG per extended release tablet, Take 1

## 2025-01-10 SDOH — ECONOMIC STABILITY: TRANSPORTATION INSECURITY
IN THE PAST 12 MONTHS, HAS LACK OF TRANSPORTATION KEPT YOU FROM MEETINGS, WORK, OR FROM GETTING THINGS NEEDED FOR DAILY LIVING?: PATIENT DECLINED

## 2025-01-10 SDOH — ECONOMIC STABILITY: INCOME INSECURITY: IN THE LAST 12 MONTHS, WAS THERE A TIME WHEN YOU WERE NOT ABLE TO PAY THE MORTGAGE OR RENT ON TIME?: PATIENT DECLINED

## 2025-01-10 SDOH — ECONOMIC STABILITY: FOOD INSECURITY: WITHIN THE PAST 12 MONTHS, YOU WORRIED THAT YOUR FOOD WOULD RUN OUT BEFORE YOU GOT MONEY TO BUY MORE.: PATIENT DECLINED

## 2025-01-10 SDOH — ECONOMIC STABILITY: FOOD INSECURITY: WITHIN THE PAST 12 MONTHS, THE FOOD YOU BOUGHT JUST DIDN'T LAST AND YOU DIDN'T HAVE MONEY TO GET MORE.: PATIENT DECLINED

## 2025-01-10 SDOH — ECONOMIC STABILITY: TRANSPORTATION INSECURITY
IN THE PAST 12 MONTHS, HAS THE LACK OF TRANSPORTATION KEPT YOU FROM MEDICAL APPOINTMENTS OR FROM GETTING MEDICATIONS?: PATIENT DECLINED

## 2025-01-13 ENCOUNTER — TELEMEDICINE (OUTPATIENT)
Dept: FAMILY MEDICINE CLINIC | Facility: CLINIC | Age: 85
End: 2025-01-13

## 2025-01-13 DIAGNOSIS — N18.32 STAGE 3B CHRONIC KIDNEY DISEASE (HCC): ICD-10-CM

## 2025-01-13 DIAGNOSIS — G20.A1 PARKINSON'S DISEASE WITHOUT DYSKINESIA OR FLUCTUATING MANIFESTATIONS (HCC): ICD-10-CM

## 2025-01-13 DIAGNOSIS — M54.42 CHRONIC LEFT-SIDED LOW BACK PAIN WITH LEFT-SIDED SCIATICA: Primary | ICD-10-CM

## 2025-01-13 DIAGNOSIS — G89.29 CHRONIC LEFT-SIDED LOW BACK PAIN WITH LEFT-SIDED SCIATICA: Primary | ICD-10-CM

## 2025-01-13 PROBLEM — Z79.899 LONG-TERM CURRENT USE OF DRUG THERAPY FOR ATTENTION DEFICIT HYPERACTIVITY DISORDER (ADHD): Status: ACTIVE | Noted: 2017-01-29

## 2025-01-13 PROBLEM — F90.9 LONG-TERM CURRENT USE OF DRUG THERAPY FOR ATTENTION DEFICIT HYPERACTIVITY DISORDER (ADHD): Status: ACTIVE | Noted: 2017-01-29

## 2025-01-13 SDOH — ECONOMIC STABILITY: FOOD INSECURITY: WITHIN THE PAST 12 MONTHS, THE FOOD YOU BOUGHT JUST DIDN'T LAST AND YOU DIDN'T HAVE MONEY TO GET MORE.: NEVER TRUE

## 2025-01-13 SDOH — ECONOMIC STABILITY: FOOD INSECURITY: WITHIN THE PAST 12 MONTHS, YOU WORRIED THAT YOUR FOOD WOULD RUN OUT BEFORE YOU GOT MONEY TO BUY MORE.: NEVER TRUE

## 2025-01-13 ASSESSMENT — ENCOUNTER SYMPTOMS
COLOR CHANGE: 0
EYES NEGATIVE: 1
GASTROINTESTINAL NEGATIVE: 1
VOICE CHANGE: 0
ALLERGIC/IMMUNOLOGIC NEGATIVE: 1
RECTAL PAIN: 0
BACK PAIN: 1
STRIDOR: 0
CHOKING: 0
SINUS PAIN: 0
PHOTOPHOBIA: 0
EYE ITCHING: 0
ABDOMINAL DISTENTION: 0
SINUS PRESSURE: 0
ABDOMINAL PAIN: 0
SHORTNESS OF BREATH: 0
ANAL BLEEDING: 0
SORE THROAT: 0
CHEST TIGHTNESS: 0
VOMITING: 0
BLOOD IN STOOL: 0
CONSTIPATION: 0
DIARRHEA: 0
RHINORRHEA: 0
EYE REDNESS: 0
RESPIRATORY NEGATIVE: 1
TROUBLE SWALLOWING: 0
EYE PAIN: 0
APNEA: 0
COUGH: 0
FACIAL SWELLING: 0
NAUSEA: 0
EYE DISCHARGE: 0
WHEEZING: 0

## 2025-01-13 ASSESSMENT — PATIENT HEALTH QUESTIONNAIRE - PHQ9
SUM OF ALL RESPONSES TO PHQ QUESTIONS 1-9: 0
2. FEELING DOWN, DEPRESSED OR HOPELESS: NOT AT ALL
SUM OF ALL RESPONSES TO PHQ9 QUESTIONS 1 & 2: 0
SUM OF ALL RESPONSES TO PHQ QUESTIONS 1-9: 0
1. LITTLE INTEREST OR PLEASURE IN DOING THINGS: NOT AT ALL
SUM OF ALL RESPONSES TO PHQ QUESTIONS 1-9: 0
SUM OF ALL RESPONSES TO PHQ QUESTIONS 1-9: 0

## 2025-01-13 NOTE — PROGRESS NOTES
PROGRESS NOTE        Consent:    Sabas Maya, was evaluated through a synchronous (real-time) audio-video encounter. The patient (or guardian if applicable) is aware that this is a billable service, which includes applicable co-pays. This Virtual Visit was conducted with patient's (and/or legal guardian's) consent. Patient identification was verified, and a caregiver was present when appropriate.   The patient was located at Home: [address] [place] [address]  Provider was located at Facility (Appt Dept): [address] [place] [address]  Confirm you are appropriately licensed, registered, or certified to deliver care in the state where the patient is located as indicated above. If you are not or unsure, please re-schedule the visit: Yes, I confirm.        On this date 1/13/2025 I have spent 30 minutes reviewing previous notes, test results and face to face (virtual) with the patient discussing the diagnosis and importance of compliance with the treatment plan as well as documenting on the day of the visit.. Greater than 50% of this visit was spent counseling the patient about test results, prognosis, importance of compliance, education about disease process, benefits of medications, instructions for management of acute symptoms, and follow up plans.      Chief Complaint   Patient presents with    Follow-up       SUBJECTIVE:     Sabas Maya is a very pleasant 84 y.o. male , with past medical history significant for ypertension, hyperlipidemia, anxiety, bladder cancer and skin cancer, seen virtually regarding follow-up.  He reports his back pain is still similar to December but slightly better.  He is still having pain and discomfort particularly with any prolonged ambulation or standing.  He reports having an appointment with his pain management in 2 days for follow-up.  He recently had an MRI of his lumbar area as pain has worsened particularly after he received his recent

## 2025-01-14 ENCOUNTER — TELEPHONE (OUTPATIENT)
Dept: ORTHOPEDIC SURGERY | Age: 85
End: 2025-01-14

## 2025-01-16 ENCOUNTER — OFFICE VISIT (OUTPATIENT)
Age: 85
End: 2025-01-16

## 2025-01-16 VITALS — BODY MASS INDEX: 28.2 KG/M2 | HEIGHT: 70 IN | WEIGHT: 197 LBS

## 2025-01-16 DIAGNOSIS — M48.062 SPINAL STENOSIS, LUMBAR REGION, WITH NEUROGENIC CLAUDICATION: ICD-10-CM

## 2025-01-16 DIAGNOSIS — M54.16 LUMBAR RADICULOPATHY: Primary | ICD-10-CM

## 2025-01-16 DIAGNOSIS — M51.362 DEGENERATION OF INTERVERTEBRAL DISC OF LUMBAR REGION WITH DISCOGENIC BACK PAIN AND LOWER EXTREMITY PAIN: ICD-10-CM

## 2025-01-16 NOTE — PROGRESS NOTES
Name: Sabas Maya  YOB: 1940  Gender: male  MRN: [id number]  Age: 84 y.o.      Chief Complaint:  Back and hip pain     History of Present Illness:      This is a very pleasant 84 y.o. male who has undergone multiple lumbar fusion surgeries while living in [place].  Ultimately, he has had an L2-4 fusion with instrumentation and an L5-S1 fusion with instrumentation.  His L4-L5 level has been decompressed with a laminectomy but has never been fused.  The patient reports progressive levels of left sided buttock and leg pain with radiation to the knee.  He has tried interventional pain management with mixed results.  He tried exercise program and oral steroids.  At this point, he is interested in a surgical opinion.  In addition to his lumbar pathology, he also has Parkinson's      Medications:       Current Outpatient Medications:     metoprolol succinate (TOPROL XL) 50 MG extended release tablet, TAKE ONE TABLET BY MOUTH ONE TIME DAILY, Disp: 100 tablet, Rfl: 3    DULoxetine (CYMBALTA) 60 MG extended release capsule, TAKE ONE CAPSULE BY MOUTH ONE TIME DAILY, Disp: 100 capsule, Rfl: 3    carbidopa-levodopa (SINEMET CR)  MG per extended release tablet, Take 1 tablet by mouth 2 times daily, Disp: 60 tablet, Rfl: 5    finasteride (PROSCAR) 5 MG tablet, TAKE ONE TABLET BY MOUTH ONE TIME DAILY, Disp: 90 tablet, Rfl: 3    azelastine (ASTELIN) 0.1 % nasal spray, 1 spray by Nasal route 2 times daily, Disp: 60 mL, Rfl: 11    atorvastatin (LIPITOR) 40 MG tablet, Take 1 tablet by mouth daily, Disp: 100 tablet, Rfl: 3    Handicap Placard MISC, by Does not apply route Please provide a DMV for Mr. Maya. Thank you!, Disp: 1 each, Rfl: 0    b complex vitamins capsule, Take 1 capsule by mouth daily, Disp: , Rfl:     acetaminophen (TYLENOL) 500 MG tablet, Take 1 tablet by mouth every 6 hours as needed for Pain, Disp: , Rfl:     fluorouracil (EFUDEX) 5 % cream, , Disp: , Rfl:     Multiple Vitamins-Minerals

## 2025-01-23 DIAGNOSIS — E78.00 HYPERCHOLESTEROLEMIA: ICD-10-CM

## 2025-01-23 RX ORDER — ATORVASTATIN CALCIUM 40 MG/1
40 TABLET, FILM COATED ORAL DAILY
Qty: 100 TABLET | Refills: 3 | Status: SHIPPED | OUTPATIENT
Start: 2025-01-23

## 2025-02-23 NOTE — PROGRESS NOTES
(TYLENOL) 500 MG tablet Take 1 tablet by mouth every 6 hours as needed for Pain      fluorouracil (EFUDEX) 5 % cream       Multiple Vitamins-Minerals (MULTIVITAMIN ADULTS 50+ PO) Multivitamin 50 Plus   ONCE DAILY      VITAMIN D PO Vitamin D   1 QD      ZINC PO zinc   1 QD      [DISCONTINUED] pregabalin (LYRICA) 50 MG capsule Take 1 capsule by mouth 2 times daily for 30 days. With food as needed for nerve pain. May cause drowsiness Max Daily Amount: 100 mg 60 capsule 0    [DISCONTINUED] carbidopa-levodopa (SINEMET CR)  MG per extended release tablet Take 1 tablet by mouth 2 times daily 60 tablet 5     No facility-administered encounter medications on file as of 2/24/2025.     ALLERGIES:  No Known Allergies      Physical Exam:     /79 (Site: Right Upper Arm, Position: Sitting)   Pulse 81   Wt 88 kg (194 lb)   SpO2 96%   BMI 28.24 kg/m²     General Exam:  General: Well developed, well nourished, in no apparent distress.   HEENT: Normocephalic, atraumatic. Sclera anicteric. Oropharynx clear.   Neck: Supple without masses  Cardiovascular: Regular rate and rhythm. No carotid bruits.   Lungs: Non-labored breathing.   Abdomen: Soft, nontender, nondistended.   Extremities: Peripheral pulses intact. No edema and no rashes.     Neurological Exam:     MS/Language/Speech:  Alert. Oriented to person, place, and today's date. He is able to state the months backwards accurately. Language fluent. Speech hypophonic.    Cranial Nerves: PERRL. Eye movements full with normal pursuits. No nystagmus.  Slight loss of facial expression.  No facial asymmetry. Tongue and palate were midline. Shoulder shrug symmetric.    Motor: Strength was full in all proximal and distal muscle groups.  There is minimal rigidity of the right upper extremity and mild cogwheeling rigidity in the left upper extremity.  There is mild spasticity noted in the lower extremities bilaterally.    Abnormal Movements: There is an intermittent low

## 2025-02-24 ENCOUNTER — OFFICE VISIT (OUTPATIENT)
Dept: NEUROLOGY | Age: 85
End: 2025-02-24
Payer: MEDICARE

## 2025-02-24 VITALS
OXYGEN SATURATION: 96 % | BODY MASS INDEX: 28.24 KG/M2 | HEART RATE: 81 BPM | SYSTOLIC BLOOD PRESSURE: 124 MMHG | DIASTOLIC BLOOD PRESSURE: 79 MMHG | WEIGHT: 194 LBS

## 2025-02-24 DIAGNOSIS — G62.9 PERIPHERAL POLYNEUROPATHY: ICD-10-CM

## 2025-02-24 DIAGNOSIS — G20.A1 PARKINSON'S DISEASE WITHOUT DYSKINESIA OR FLUCTUATING MANIFESTATIONS (HCC): Primary | ICD-10-CM

## 2025-02-24 DIAGNOSIS — R26.9 GAIT DISTURBANCE: ICD-10-CM

## 2025-02-24 PROCEDURE — 1159F MED LIST DOCD IN RCRD: CPT | Performed by: PSYCHIATRY & NEUROLOGY

## 2025-02-24 PROCEDURE — 99214 OFFICE O/P EST MOD 30 MIN: CPT | Performed by: PSYCHIATRY & NEUROLOGY

## 2025-02-24 PROCEDURE — 1123F ACP DISCUSS/DSCN MKR DOCD: CPT | Performed by: PSYCHIATRY & NEUROLOGY

## 2025-02-24 PROCEDURE — 1036F TOBACCO NON-USER: CPT | Performed by: PSYCHIATRY & NEUROLOGY

## 2025-02-24 PROCEDURE — 3078F DIAST BP <80 MM HG: CPT | Performed by: PSYCHIATRY & NEUROLOGY

## 2025-02-24 PROCEDURE — G8417 CALC BMI ABV UP PARAM F/U: HCPCS | Performed by: PSYCHIATRY & NEUROLOGY

## 2025-02-24 PROCEDURE — 3074F SYST BP LT 130 MM HG: CPT | Performed by: PSYCHIATRY & NEUROLOGY

## 2025-02-24 PROCEDURE — G8427 DOCREV CUR MEDS BY ELIG CLIN: HCPCS | Performed by: PSYCHIATRY & NEUROLOGY

## 2025-02-24 RX ORDER — CARBIDOPA AND LEVODOPA 50; 200 MG/1; MG/1
1 TABLET, EXTENDED RELEASE ORAL 2 TIMES DAILY
Qty: 60 TABLET | Refills: 5 | Status: SHIPPED | OUTPATIENT
Start: 2025-02-24

## 2025-03-01 ENCOUNTER — PATIENT MESSAGE (OUTPATIENT)
Dept: NEUROLOGY | Age: 85
End: 2025-03-01

## 2025-03-01 DIAGNOSIS — G20.A1 PARKINSON'S DISEASE WITHOUT DYSKINESIA OR FLUCTUATING MANIFESTATIONS (HCC): ICD-10-CM

## 2025-03-03 RX ORDER — CARBIDOPA AND LEVODOPA 25; 100 MG/1; MG/1
1 TABLET, EXTENDED RELEASE ORAL 2 TIMES DAILY
Qty: 60 TABLET | Refills: 5 | Status: SHIPPED | OUTPATIENT
Start: 2025-03-03

## 2025-05-06 ENCOUNTER — APPOINTMENT (OUTPATIENT)
Dept: URBAN - METROPOLITAN AREA CLINIC 330 | Facility: CLINIC | Age: 85
Setting detail: DERMATOLOGY
End: 2025-05-06

## 2025-05-06 DIAGNOSIS — Z85.820 PERSONAL HISTORY OF MALIGNANT MELANOMA OF SKIN: ICD-10-CM

## 2025-05-06 DIAGNOSIS — Z85.828 PERSONAL HISTORY OF OTHER MALIGNANT NEOPLASM OF SKIN: ICD-10-CM

## 2025-05-06 DIAGNOSIS — L57.0 ACTINIC KERATOSIS: ICD-10-CM

## 2025-05-06 DIAGNOSIS — D22 MELANOCYTIC NEVI: ICD-10-CM | Status: STABLE

## 2025-05-06 DIAGNOSIS — L57.8 OTHER SKIN CHANGES DUE TO CHRONIC EXPOSURE TO NONIONIZING RADIATION: ICD-10-CM

## 2025-05-06 PROBLEM — D22.5 MELANOCYTIC NEVI OF TRUNK: Status: ACTIVE | Noted: 2025-05-06

## 2025-05-06 PROCEDURE — 17003 DESTRUCT PREMALG LES 2-14: CPT

## 2025-05-06 PROCEDURE — ? FULL BODY SKIN EXAM

## 2025-05-06 PROCEDURE — ? TREATMENT REGIMEN

## 2025-05-06 PROCEDURE — ? COUNSELING

## 2025-05-06 PROCEDURE — ? MEDICAL PHOTOGRAPHY REVIEW

## 2025-05-06 PROCEDURE — 17000 DESTRUCT PREMALG LESION: CPT

## 2025-05-06 PROCEDURE — ? OTHER

## 2025-05-06 PROCEDURE — 99213 OFFICE O/P EST LOW 20 MIN: CPT | Mod: 25

## 2025-05-06 PROCEDURE — ? LIQUID NITROGEN

## 2025-05-06 ASSESSMENT — LOCATION SIMPLE DESCRIPTION DERM
LOCATION SIMPLE: RIGHT TEMPLE
LOCATION SIMPLE: LEFT UPPER BACK
LOCATION SIMPLE: RIGHT SCALP
LOCATION SIMPLE: POSTERIOR SCALP
LOCATION SIMPLE: LEFT SCALP
LOCATION SIMPLE: UPPER BACK
LOCATION SIMPLE: LEFT FOREARM
LOCATION SIMPLE: LEFT HAND
LOCATION SIMPLE: RIGHT UPPER BACK
LOCATION SIMPLE: SCALP
LOCATION SIMPLE: LEFT CLAVICULAR SKIN

## 2025-05-06 ASSESSMENT — LOCATION DETAILED DESCRIPTION DERM
LOCATION DETAILED: RIGHT LATERAL TEMPLE
LOCATION DETAILED: LEFT MEDIAL UPPER BACK
LOCATION DETAILED: SUPERIOR THORACIC SPINE
LOCATION DETAILED: INFERIOR THORACIC SPINE
LOCATION DETAILED: LEFT DISTAL DORSAL FOREARM
LOCATION DETAILED: LEFT CLAVICULAR SKIN
LOCATION DETAILED: RIGHT MEDIAL UPPER BACK
LOCATION DETAILED: LEFT SUPERIOR FRONTAL SCALP
LOCATION DETAILED: POSTERIOR MID-PARIETAL SCALP
LOCATION DETAILED: LEFT RADIAL DORSAL HAND
LOCATION DETAILED: RIGHT MEDIAL FRONTAL SCALP
LOCATION DETAILED: LEFT CENTRAL FRONTAL SCALP

## 2025-05-06 ASSESSMENT — LOCATION ZONE DERM
LOCATION ZONE: SCALP
LOCATION ZONE: TRUNK
LOCATION ZONE: ARM
LOCATION ZONE: FACE
LOCATION ZONE: HAND

## 2025-05-06 NOTE — PROCEDURE: LIQUID NITROGEN
Render Note In Bullet Format When Appropriate: No
Show Aperture Variable?: Yes
Number Of Freeze-Thaw Cycles: 3 freeze-thaw cycles
Duration Of Freeze Thaw-Cycle (Seconds): 2
Consent: The patient's consent was obtained including but not limited to risks of crusting, scabbing, blistering, scarring, darker or lighter pigmentary change, recurrence, incomplete removal and infection.
Post-Care Instructions: I reviewed with the patient in detail post-care instructions. Patient is to wear sunprotection, and avoid picking at any of the treated lesions. Pt may apply Vaseline to crusted or scabbing areas.
Detail Level: Detailed

## 2025-05-29 ENCOUNTER — LAB (OUTPATIENT)
Dept: FAMILY MEDICINE CLINIC | Facility: CLINIC | Age: 85
End: 2025-05-29
Payer: MEDICARE

## 2025-05-29 DIAGNOSIS — I47.10 PSVT (PAROXYSMAL SUPRAVENTRICULAR TACHYCARDIA): ICD-10-CM

## 2025-05-29 DIAGNOSIS — N18.32 STAGE 3B CHRONIC KIDNEY DISEASE (HCC): ICD-10-CM

## 2025-05-29 DIAGNOSIS — E78.00 HYPERCHOLESTEROLEMIA: Primary | ICD-10-CM

## 2025-05-29 DIAGNOSIS — R73.09 OTHER ABNORMAL GLUCOSE: ICD-10-CM

## 2025-05-29 DIAGNOSIS — E78.00 HYPERCHOLESTEROLEMIA: ICD-10-CM

## 2025-05-29 DIAGNOSIS — E55.9 VITAMIN D DEFICIENCY: ICD-10-CM

## 2025-05-29 DIAGNOSIS — K21.9 GASTROESOPHAGEAL REFLUX DISEASE WITHOUT ESOPHAGITIS: ICD-10-CM

## 2025-05-29 DIAGNOSIS — I10 ESSENTIAL (PRIMARY) HYPERTENSION: ICD-10-CM

## 2025-05-29 LAB
25(OH)D3 SERPL-MCNC: 119 NG/ML (ref 30–100)
ALBUMIN SERPL-MCNC: 3.8 G/DL (ref 3.2–4.6)
ALBUMIN/GLOB SERPL: 1.3 (ref 1–1.9)
ALP SERPL-CCNC: 99 U/L (ref 40–129)
ALT SERPL-CCNC: 10 U/L (ref 8–55)
ANION GAP SERPL CALC-SCNC: 11 MMOL/L (ref 7–16)
AST SERPL-CCNC: 28 U/L (ref 15–37)
BASOPHILS # BLD: 0.05 K/UL (ref 0–0.2)
BASOPHILS NFR BLD: 0.5 % (ref 0–2)
BILIRUB SERPL-MCNC: 0.5 MG/DL (ref 0–1.2)
BILIRUBIN, URINE, POC: NEGATIVE
BLOOD URINE, POC: NEGATIVE
BUN SERPL-MCNC: 33 MG/DL (ref 8–23)
CALCIUM SERPL-MCNC: 9.7 MG/DL (ref 8.8–10.2)
CHLORIDE SERPL-SCNC: 104 MMOL/L (ref 98–107)
CHOLEST SERPL-MCNC: 105 MG/DL (ref 0–200)
CO2 SERPL-SCNC: 27 MMOL/L (ref 20–29)
CREAT SERPL-MCNC: 1.38 MG/DL (ref 0.8–1.3)
DIFFERENTIAL METHOD BLD: NORMAL
EOSINOPHIL # BLD: 0.39 K/UL (ref 0–0.8)
EOSINOPHIL NFR BLD: 4.2 % (ref 0.5–7.8)
ERYTHROCYTE [DISTWIDTH] IN BLOOD BY AUTOMATED COUNT: 12.7 % (ref 11.9–14.6)
EST. AVERAGE GLUCOSE BLD GHB EST-MCNC: 111 MG/DL
GLOBULIN SER CALC-MCNC: 3 G/DL (ref 2.3–3.5)
GLUCOSE SERPL-MCNC: 104 MG/DL (ref 70–99)
GLUCOSE URINE, POC: NEGATIVE
HBA1C MFR BLD: 5.5 % (ref 0–5.6)
HCT VFR BLD AUTO: 43.6 % (ref 41.1–50.3)
HDLC SERPL-MCNC: 39 MG/DL (ref 40–60)
HDLC SERPL: 2.7 (ref 0–5)
HGB BLD-MCNC: 14.8 G/DL (ref 13.6–17.2)
IMM GRANULOCYTES # BLD AUTO: 0.03 K/UL (ref 0–0.5)
IMM GRANULOCYTES NFR BLD AUTO: 0.3 % (ref 0–5)
KETONES, URINE, POC: NEGATIVE
LDLC SERPL CALC-MCNC: 43 MG/DL (ref 0–100)
LEUKOCYTE ESTERASE, URINE, POC: NEGATIVE
LYMPHOCYTES # BLD: 2.37 K/UL (ref 0.5–4.6)
LYMPHOCYTES NFR BLD: 25.5 % (ref 13–44)
MAGNESIUM SERPL-MCNC: 2 MG/DL (ref 1.8–2.4)
MCH RBC QN AUTO: 32.5 PG (ref 26.1–32.9)
MCHC RBC AUTO-ENTMCNC: 33.9 G/DL (ref 31.4–35)
MCV RBC AUTO: 95.8 FL (ref 82–102)
MONOCYTES # BLD: 0.75 K/UL (ref 0.1–1.3)
MONOCYTES NFR BLD: 8.1 % (ref 4–12)
NEUTS SEG # BLD: 5.71 K/UL (ref 1.7–8.2)
NEUTS SEG NFR BLD: 61.4 % (ref 43–78)
NITRITE, URINE, POC: NEGATIVE
NRBC # BLD: 0 K/UL (ref 0–0.2)
PH, URINE, POC: 5.5 (ref 4.6–8)
PLATELET # BLD AUTO: 291 K/UL (ref 150–450)
PMV BLD AUTO: 10 FL (ref 9.4–12.3)
POTASSIUM SERPL-SCNC: 4.3 MMOL/L (ref 3.5–5.1)
PROT SERPL-MCNC: 6.9 G/DL (ref 6.3–8.2)
PROTEIN,URINE, POC: NEGATIVE
RBC # BLD AUTO: 4.55 M/UL (ref 4.23–5.6)
SODIUM SERPL-SCNC: 141 MMOL/L (ref 136–145)
SPECIFIC GRAVITY, URINE, POC: 1.02 (ref 1–1.03)
TRIGL SERPL-MCNC: 112 MG/DL (ref 0–150)
TSH W FREE THYROID IF ABNORMAL: 2.14 UIU/ML (ref 0.27–4.2)
URINALYSIS CLARITY, POC: CLEAR
URINALYSIS COLOR, POC: YELLOW
UROBILINOGEN, POC: NORMAL
VLDLC SERPL CALC-MCNC: 22 MG/DL (ref 6–23)
WBC # BLD AUTO: 9.3 K/UL (ref 4.3–11.1)

## 2025-05-29 PROCEDURE — 36415 COLL VENOUS BLD VENIPUNCTURE: CPT | Performed by: NURSE PRACTITIONER

## 2025-05-29 PROCEDURE — 81003 URINALYSIS AUTO W/O SCOPE: CPT | Performed by: NURSE PRACTITIONER

## 2025-05-30 ENCOUNTER — RESULTS FOLLOW-UP (OUTPATIENT)
Dept: FAMILY MEDICINE CLINIC | Facility: CLINIC | Age: 85
End: 2025-05-30

## 2025-06-03 SDOH — HEALTH STABILITY: PHYSICAL HEALTH: ON AVERAGE, HOW MANY DAYS PER WEEK DO YOU ENGAGE IN MODERATE TO STRENUOUS EXERCISE (LIKE A BRISK WALK)?: 0 DAYS

## 2025-06-03 ASSESSMENT — PATIENT HEALTH QUESTIONNAIRE - PHQ9
2. FEELING DOWN, DEPRESSED OR HOPELESS: NOT AT ALL
1. LITTLE INTEREST OR PLEASURE IN DOING THINGS: NOT AT ALL
SUM OF ALL RESPONSES TO PHQ QUESTIONS 1-9: 0

## 2025-06-03 ASSESSMENT — LIFESTYLE VARIABLES
HOW OFTEN DO YOU HAVE A DRINK CONTAINING ALCOHOL: 1
HOW MANY STANDARD DRINKS CONTAINING ALCOHOL DO YOU HAVE ON A TYPICAL DAY: 0
HOW OFTEN DO YOU HAVE A DRINK CONTAINING ALCOHOL: NEVER
HOW OFTEN DO YOU HAVE SIX OR MORE DRINKS ON ONE OCCASION: 1
HOW MANY STANDARD DRINKS CONTAINING ALCOHOL DO YOU HAVE ON A TYPICAL DAY: PATIENT DOES NOT DRINK

## 2025-06-06 ENCOUNTER — OFFICE VISIT (OUTPATIENT)
Dept: FAMILY MEDICINE CLINIC | Facility: CLINIC | Age: 85
End: 2025-06-06

## 2025-06-06 VITALS
BODY MASS INDEX: 27.11 KG/M2 | OXYGEN SATURATION: 100 % | SYSTOLIC BLOOD PRESSURE: 97 MMHG | HEIGHT: 70 IN | DIASTOLIC BLOOD PRESSURE: 60 MMHG | HEART RATE: 89 BPM | WEIGHT: 189.4 LBS

## 2025-06-06 DIAGNOSIS — R25.1 TREMOR: ICD-10-CM

## 2025-06-06 DIAGNOSIS — Z00.00 ENCOUNTER FOR ANNUAL WELLNESS VISIT (AWV) IN MEDICARE PATIENT: Primary | ICD-10-CM

## 2025-06-06 DIAGNOSIS — N18.31 STAGE 3A CHRONIC KIDNEY DISEASE (HCC): ICD-10-CM

## 2025-06-06 DIAGNOSIS — E55.9 VITAMIN D DEFICIENCY: ICD-10-CM

## 2025-06-06 DIAGNOSIS — G20.A1 PARKINSON'S DISEASE WITHOUT DYSKINESIA OR FLUCTUATING MANIFESTATIONS (HCC): ICD-10-CM

## 2025-06-06 DIAGNOSIS — E78.00 HYPERCHOLESTEROLEMIA: ICD-10-CM

## 2025-06-06 DIAGNOSIS — I10 ESSENTIAL (PRIMARY) HYPERTENSION: ICD-10-CM

## 2025-06-06 DIAGNOSIS — F41.1 GENERALIZED ANXIETY DISORDER: ICD-10-CM

## 2025-06-06 DIAGNOSIS — R73.09 OTHER ABNORMAL GLUCOSE: ICD-10-CM

## 2025-06-06 DIAGNOSIS — Z12.5 ENCOUNTER FOR PROSTATE CANCER SCREENING: ICD-10-CM

## 2025-06-06 RX ORDER — FINASTERIDE 5 MG/1
5 TABLET, FILM COATED ORAL DAILY
Qty: 100 TABLET | Refills: 3 | Status: SHIPPED | OUTPATIENT
Start: 2025-06-06

## 2025-06-06 RX ORDER — LORAZEPAM 1 MG/1
1 TABLET ORAL EVERY 8 HOURS PRN
Qty: 90 TABLET | Refills: 1 | Status: SHIPPED | OUTPATIENT
Start: 2025-06-06 | End: 2025-12-03

## 2025-06-06 ASSESSMENT — ENCOUNTER SYMPTOMS
CHEST TIGHTNESS: 0
EYES NEGATIVE: 1
EYE PAIN: 0
APNEA: 0
ABDOMINAL PAIN: 0
STRIDOR: 0
VOMITING: 0
GASTROINTESTINAL NEGATIVE: 1
SINUS PRESSURE: 0
RHINORRHEA: 0
FACIAL SWELLING: 0
ALLERGIC/IMMUNOLOGIC NEGATIVE: 1
CHOKING: 0
EYE REDNESS: 0
BACK PAIN: 1
ABDOMINAL DISTENTION: 0
EYE DISCHARGE: 0
VOICE CHANGE: 0
COLOR CHANGE: 0
RESPIRATORY NEGATIVE: 1
COUGH: 0
TROUBLE SWALLOWING: 0
SHORTNESS OF BREATH: 0
BLOOD IN STOOL: 0
EYE ITCHING: 0
WHEEZING: 0
PHOTOPHOBIA: 0
DIARRHEA: 0
ANAL BLEEDING: 0
NAUSEA: 0
RECTAL PAIN: 0
SORE THROAT: 0
SINUS PAIN: 0
CONSTIPATION: 0

## 2025-06-06 NOTE — PROGRESS NOTES
PROGRESS NOTE    Chief Complaint   Patient presents with    Medicare AWV       SUBJECTIVE:         History of Present Illness  The patient presents for evaluation of tremors, elevated PSA, and health maintenance.  Patient is accompanied by his spouse for today's visit.  He is also due for his Medicare AWV.    He reports persistent tremors in his left hand, which have not shown any signs of worsening. He is currently on a regimen of lorazepam, taking half a tablet at night mainly at bedtime to help with sleep.  He has previously tried Lyrica, but it did not yield any noticeable improvement.    He has a history of elevated PSA levels, with a peak reading of 10. He has undergone 2 prostate biopsies in the past, both of which returned negative results. His PSA was last checked on 12/06/2024, with a result of 3.3. His current medication includes finasteride.    He has been maintaining adequate hydration by consuming water frequently. He has discontinued the use of ibuprofen and Aleve, opting for Tylenol instead. He also reports a decrease in his overall medication intake since his last visit to the nephrologist.        Past Medical History, Past Surgical History, Family history, Social History, and Medications were all reviewed with the patient today and updated as necessary.       Current Outpatient Medications   Medication Sig Dispense Refill    finasteride (PROSCAR) 5 MG tablet Take 1 tablet by mouth daily 100 tablet 3    LORazepam (ATIVAN) 1 MG tablet Take 1 tablet by mouth every 8 hours as needed for Anxiety for up to 180 days. Max Daily Amount: 3 mg 90 tablet 1    carbidopa-levodopa (SINEMET CR)  MG per extended release tablet Take 1 tablet by mouth 2 times daily 60 tablet 5    atorvastatin (LIPITOR) 40 MG tablet TAKE ONE TABLET BY MOUTH ONE TIME DAILY 100 tablet 3    metoprolol succinate (TOPROL XL) 50 MG extended release tablet TAKE ONE TABLET BY MOUTH ONE TIME DAILY 100 tablet 3    DULoxetine

## 2025-07-10 ENCOUNTER — OFFICE VISIT (OUTPATIENT)
Age: 85
End: 2025-07-10
Payer: MEDICARE

## 2025-07-10 VITALS
WEIGHT: 189 LBS | HEART RATE: 59 BPM | DIASTOLIC BLOOD PRESSURE: 60 MMHG | SYSTOLIC BLOOD PRESSURE: 100 MMHG | BODY MASS INDEX: 27.51 KG/M2

## 2025-07-10 DIAGNOSIS — I25.10 ATHEROSCLEROSIS OF NATIVE CORONARY ARTERY OF NATIVE HEART WITHOUT ANGINA PECTORIS: Primary | ICD-10-CM

## 2025-07-10 DIAGNOSIS — I49.3 PVC (PREMATURE VENTRICULAR CONTRACTION): ICD-10-CM

## 2025-07-10 DIAGNOSIS — I47.10 PSVT (PAROXYSMAL SUPRAVENTRICULAR TACHYCARDIA): ICD-10-CM

## 2025-07-10 DIAGNOSIS — R01.1 MURMUR: ICD-10-CM

## 2025-07-10 PROCEDURE — 1125F AMNT PAIN NOTED PAIN PRSNT: CPT | Performed by: INTERNAL MEDICINE

## 2025-07-10 PROCEDURE — 1159F MED LIST DOCD IN RCRD: CPT | Performed by: INTERNAL MEDICINE

## 2025-07-10 PROCEDURE — 3074F SYST BP LT 130 MM HG: CPT | Performed by: INTERNAL MEDICINE

## 2025-07-10 PROCEDURE — 3078F DIAST BP <80 MM HG: CPT | Performed by: INTERNAL MEDICINE

## 2025-07-10 PROCEDURE — G8427 DOCREV CUR MEDS BY ELIG CLIN: HCPCS | Performed by: INTERNAL MEDICINE

## 2025-07-10 PROCEDURE — G8417 CALC BMI ABV UP PARAM F/U: HCPCS | Performed by: INTERNAL MEDICINE

## 2025-07-10 PROCEDURE — 1036F TOBACCO NON-USER: CPT | Performed by: INTERNAL MEDICINE

## 2025-07-10 PROCEDURE — 1123F ACP DISCUSS/DSCN MKR DOCD: CPT | Performed by: INTERNAL MEDICINE

## 2025-07-10 PROCEDURE — 99214 OFFICE O/P EST MOD 30 MIN: CPT | Performed by: INTERNAL MEDICINE

## 2025-07-10 PROCEDURE — 93000 ELECTROCARDIOGRAM COMPLETE: CPT | Performed by: INTERNAL MEDICINE

## 2025-07-10 PROCEDURE — 1160F RVW MEDS BY RX/DR IN RCRD: CPT | Performed by: INTERNAL MEDICINE

## 2025-07-10 ASSESSMENT — ENCOUNTER SYMPTOMS
DIARRHEA: 0
WHEEZING: 0
BOWEL INCONTINENCE: 0
COLOR CHANGE: 0
ABDOMINAL PAIN: 0
ORTHOPNEA: 0
HEMATEMESIS: 0
BLURRED VISION: 0
BACK PAIN: 1
HOARSE VOICE: 0
HEMATOCHEZIA: 0
SPUTUM PRODUCTION: 0

## 2025-07-10 NOTE — PROGRESS NOTES
[facility] CARDIOLOGY  [address] [place] [address], [facility] [address]  [place] [address]  PHONE: [phone]        07/10/25        NAME:  Sabas Maya  : 1940  MRN: [id number]       SUBJECTIVE:   Sabas Maya is a 84 y.o. male seen for a follow up visit regarding the following: The patient has a history of primary hypertension, Parkinson's disease, PSVT, and PVCs.  He had a Lexiscan in 2024 that reported no reversible ischemia and normal left ventricular ejection fraction.  He returns for scheduled follow-up.  Overall, the patient reports doing well.  He does admit to occasional low back pain and shortness of breath when performing chores around the house such as mowing the lawn.  He reports no chest pain.    Chief Complaint   Patient presents with    PVC (premature ventricular contraction)    PSVT (paroxysmal supraventricular tachycardia)       HPI:    Hypertension  This is a chronic problem. The problem is unchanged. The problem is controlled. Pertinent negatives include no anxiety, blurred vision, chest pain, headaches, malaise/fatigue, neck pain, orthopnea, peripheral edema, PND or sweats.       Past Medical History, Past Surgical History, Family history, Social History, and Medications were all reviewed with the patient today and updated as necessary.         Current Outpatient Medications:     finasteride (PROSCAR) 5 MG tablet, Take 1 tablet by mouth daily, Disp: 100 tablet, Rfl: 3    LORazepam (ATIVAN) 1 MG tablet, Take 1 tablet by mouth every 8 hours as needed for Anxiety for up to 180 days. Max Daily Amount: 3 mg, Disp: 90 tablet, Rfl: 1    carbidopa-levodopa (SINEMET CR)  MG per extended release tablet, Take 1 tablet by mouth 2 times daily, Disp: 60 tablet, Rfl: 5    atorvastatin (LIPITOR) 40 MG tablet, TAKE ONE TABLET BY MOUTH ONE TIME DAILY, Disp: 100 tablet, Rfl: 3    metoprolol succinate (TOPROL XL) 50 MG extended release tablet, TAKE ONE TABLET BY MOUTH ONE TIME DAILY, Disp: 100 tablet,

## 2025-08-25 ENCOUNTER — OFFICE VISIT (OUTPATIENT)
Dept: NEUROLOGY | Age: 85
End: 2025-08-25
Payer: MEDICARE

## 2025-08-25 VITALS
WEIGHT: 186 LBS | BODY MASS INDEX: 27.07 KG/M2 | SYSTOLIC BLOOD PRESSURE: 129 MMHG | HEART RATE: 80 BPM | OXYGEN SATURATION: 98 % | DIASTOLIC BLOOD PRESSURE: 80 MMHG

## 2025-08-25 DIAGNOSIS — R26.9 GAIT DISTURBANCE: ICD-10-CM

## 2025-08-25 DIAGNOSIS — G20.A1 PARKINSON'S DISEASE WITHOUT DYSKINESIA OR FLUCTUATING MANIFESTATIONS (HCC): Primary | ICD-10-CM

## 2025-08-25 DIAGNOSIS — G62.9 PERIPHERAL POLYNEUROPATHY: ICD-10-CM

## 2025-08-25 DIAGNOSIS — R13.12 OROPHARYNGEAL DYSPHAGIA: ICD-10-CM

## 2025-08-25 PROCEDURE — G8417 CALC BMI ABV UP PARAM F/U: HCPCS | Performed by: PSYCHIATRY & NEUROLOGY

## 2025-08-25 PROCEDURE — 3079F DIAST BP 80-89 MM HG: CPT | Performed by: PSYCHIATRY & NEUROLOGY

## 2025-08-25 PROCEDURE — 1123F ACP DISCUSS/DSCN MKR DOCD: CPT | Performed by: PSYCHIATRY & NEUROLOGY

## 2025-08-25 PROCEDURE — G8427 DOCREV CUR MEDS BY ELIG CLIN: HCPCS | Performed by: PSYCHIATRY & NEUROLOGY

## 2025-08-25 PROCEDURE — 1036F TOBACCO NON-USER: CPT | Performed by: PSYCHIATRY & NEUROLOGY

## 2025-08-25 PROCEDURE — 99214 OFFICE O/P EST MOD 30 MIN: CPT | Performed by: PSYCHIATRY & NEUROLOGY

## 2025-08-25 PROCEDURE — 1159F MED LIST DOCD IN RCRD: CPT | Performed by: PSYCHIATRY & NEUROLOGY

## 2025-08-25 PROCEDURE — 3074F SYST BP LT 130 MM HG: CPT | Performed by: PSYCHIATRY & NEUROLOGY

## 2025-08-25 RX ORDER — CARBIDOPA AND LEVODOPA 25; 100 MG/1; MG/1
1 TABLET, EXTENDED RELEASE ORAL 2 TIMES DAILY
Qty: 180 TABLET | Refills: 3 | Status: SHIPPED | OUTPATIENT
Start: 2025-08-25

## 2025-08-25 ASSESSMENT — PATIENT HEALTH QUESTIONNAIRE - PHQ9
1. LITTLE INTEREST OR PLEASURE IN DOING THINGS: SEVERAL DAYS
2. FEELING DOWN, DEPRESSED OR HOPELESS: SEVERAL DAYS
DEPRESSION UNABLE TO ASSESS: FUNCTIONAL CAPACITY MOTIVATION LIMITS ACCURACY
SUM OF ALL RESPONSES TO PHQ QUESTIONS 1-9: 2